# Patient Record
Sex: FEMALE | Race: WHITE | Employment: OTHER | URBAN - METROPOLITAN AREA
[De-identification: names, ages, dates, MRNs, and addresses within clinical notes are randomized per-mention and may not be internally consistent; named-entity substitution may affect disease eponyms.]

---

## 2017-08-31 ENCOUNTER — HOSPITAL ENCOUNTER (EMERGENCY)
Facility: HOSPITAL | Age: 62
Discharge: HOME | End: 2017-08-31
Attending: EMERGENCY MEDICINE
Payer: MEDICAID

## 2017-08-31 ENCOUNTER — APPOINTMENT (OUTPATIENT)
Dept: CT IMAGING | Facility: HOSPITAL | Age: 62
End: 2017-08-31
Attending: EMERGENCY MEDICINE
Payer: MEDICAID

## 2017-08-31 VITALS
OXYGEN SATURATION: 95 % | HEIGHT: 64 IN | RESPIRATION RATE: 18 BRPM | BODY MASS INDEX: 17.07 KG/M2 | TEMPERATURE: 98 F | HEART RATE: 79 BPM | WEIGHT: 100 LBS | DIASTOLIC BLOOD PRESSURE: 97 MMHG | SYSTOLIC BLOOD PRESSURE: 159 MMHG

## 2017-08-31 DIAGNOSIS — R63.4 WEIGHT LOSS, UNINTENTIONAL: Primary | ICD-10-CM

## 2017-08-31 DIAGNOSIS — R10.9 ABDOMINAL PAIN OF UNKNOWN ETIOLOGY: ICD-10-CM

## 2017-08-31 LAB
ALBUMIN SERPL-MCNC: 3.5 G/DL (ref 3.5–4.8)
ALP LIVER SERPL-CCNC: 70 U/L (ref 50–130)
ALT SERPL-CCNC: 24 U/L (ref 14–54)
AST SERPL-CCNC: 13 U/L (ref 15–41)
BASOPHILS # BLD AUTO: 0.07 X10(3) UL (ref 0–0.1)
BASOPHILS NFR BLD AUTO: 0.5 %
BILIRUB SERPL-MCNC: 0.4 MG/DL (ref 0.1–2)
BILIRUB UR QL STRIP.AUTO: NEGATIVE
BUN BLD-MCNC: 15 MG/DL (ref 8–20)
CALCIUM BLD-MCNC: 9.2 MG/DL (ref 8.3–10.3)
CHLORIDE: 101 MMOL/L (ref 101–111)
CLARITY UR REFRACT.AUTO: CLEAR
CO2: 23 MMOL/L (ref 22–32)
CREAT BLD-MCNC: 0.68 MG/DL (ref 0.55–1.02)
EOSINOPHIL # BLD AUTO: 0.45 X10(3) UL (ref 0–0.3)
EOSINOPHIL NFR BLD AUTO: 3.1 %
ERYTHROCYTE [DISTWIDTH] IN BLOOD BY AUTOMATED COUNT: 15.8 % (ref 11.5–16)
GLUCOSE BLD-MCNC: 88 MG/DL (ref 70–99)
GLUCOSE UR STRIP.AUTO-MCNC: NEGATIVE MG/DL
HCT VFR BLD AUTO: 40 % (ref 34–50)
HGB BLD-MCNC: 13.6 G/DL (ref 12–16)
IMMATURE GRANULOCYTE COUNT: 0.07 X10(3) UL (ref 0–1)
IMMATURE GRANULOCYTE RATIO %: 0.5 %
KETONES UR STRIP.AUTO-MCNC: NEGATIVE MG/DL
LEUKOCYTE ESTERASE UR QL STRIP.AUTO: NEGATIVE
LIPASE: 348 U/L (ref 73–393)
LYMPHOCYTES # BLD AUTO: 3.65 X10(3) UL (ref 0.9–4)
LYMPHOCYTES NFR BLD AUTO: 25.3 %
M PROTEIN MFR SERPL ELPH: 6.6 G/DL (ref 6.1–8.3)
MCH RBC QN AUTO: 31.1 PG (ref 27–33.2)
MCHC RBC AUTO-ENTMCNC: 34 G/DL (ref 31–37)
MCV RBC AUTO: 91.5 FL (ref 81–100)
MONOCYTES # BLD AUTO: 1.09 X10(3) UL (ref 0.1–0.6)
MONOCYTES NFR BLD AUTO: 7.5 %
NEUTROPHIL ABS PRELIM: 9.11 X10 (3) UL (ref 1.3–6.7)
NEUTROPHILS # BLD AUTO: 9.11 X10(3) UL (ref 1.3–6.7)
NEUTROPHILS NFR BLD AUTO: 63.1 %
NITRITE UR QL STRIP.AUTO: NEGATIVE
PH UR STRIP.AUTO: 6 [PH] (ref 4.5–8)
PLATELET # BLD AUTO: 327 10(3)UL (ref 150–450)
POTASSIUM SERPL-SCNC: 3.9 MMOL/L (ref 3.6–5.1)
PROT UR STRIP.AUTO-MCNC: NEGATIVE MG/DL
RBC # BLD AUTO: 4.37 X10(6)UL (ref 3.8–5.1)
RBC UR QL AUTO: NEGATIVE
RED CELL DISTRIBUTION WIDTH-SD: 53 FL (ref 35.1–46.3)
SODIUM SERPL-SCNC: 132 MMOL/L (ref 136–144)
SP GR UR STRIP.AUTO: 1.01 (ref 1–1.03)
UROBILINOGEN UR STRIP.AUTO-MCNC: <2 MG/DL
WBC # BLD AUTO: 14.4 X10(3) UL (ref 4–13)

## 2017-08-31 PROCEDURE — 99285 EMERGENCY DEPT VISIT HI MDM: CPT

## 2017-08-31 PROCEDURE — S0028 INJECTION, FAMOTIDINE, 20 MG: HCPCS | Performed by: EMERGENCY MEDICINE

## 2017-08-31 PROCEDURE — 81003 URINALYSIS AUTO W/O SCOPE: CPT | Performed by: EMERGENCY MEDICINE

## 2017-08-31 PROCEDURE — 80053 COMPREHEN METABOLIC PANEL: CPT | Performed by: EMERGENCY MEDICINE

## 2017-08-31 PROCEDURE — 96361 HYDRATE IV INFUSION ADD-ON: CPT

## 2017-08-31 PROCEDURE — 96374 THER/PROPH/DIAG INJ IV PUSH: CPT

## 2017-08-31 PROCEDURE — 85025 COMPLETE CBC W/AUTO DIFF WBC: CPT | Performed by: EMERGENCY MEDICINE

## 2017-08-31 PROCEDURE — 83690 ASSAY OF LIPASE: CPT | Performed by: EMERGENCY MEDICINE

## 2017-08-31 PROCEDURE — 74177 CT ABD & PELVIS W/CONTRAST: CPT | Performed by: EMERGENCY MEDICINE

## 2017-08-31 PROCEDURE — 99284 EMERGENCY DEPT VISIT MOD MDM: CPT

## 2017-08-31 RX ORDER — TRAMADOL HYDROCHLORIDE 50 MG/1
50 TABLET ORAL EVERY 8 HOURS PRN
COMMUNITY
End: 2020-08-04

## 2017-08-31 RX ORDER — FAMOTIDINE 10 MG/ML
20 INJECTION, SOLUTION INTRAVENOUS ONCE
Status: COMPLETED | OUTPATIENT
Start: 2017-08-31 | End: 2017-08-31

## 2017-08-31 RX ORDER — MAGNESIUM HYDROXIDE/ALUMINUM HYDROXICE/SIMETHICONE 120; 1200; 1200 MG/30ML; MG/30ML; MG/30ML
30 SUSPENSION ORAL ONCE
Status: COMPLETED | OUTPATIENT
Start: 2017-08-31 | End: 2017-08-31

## 2017-08-31 RX ORDER — ENALAPRIL MALEATE 5 MG/1
20 TABLET ORAL 2 TIMES DAILY
Status: ON HOLD | COMMUNITY
End: 2020-07-29

## 2017-08-31 RX ORDER — OMEPRAZOLE 40 MG/1
40 CAPSULE, DELAYED RELEASE ORAL DAILY
Qty: 30 CAPSULE | Refills: 0 | Status: SHIPPED | OUTPATIENT
Start: 2017-08-31 | End: 2017-09-30

## 2017-09-01 NOTE — ED PROVIDER NOTES
Patient Seen in: [facility] Emergency Department    History   Patient presents with:  Abdomen/Flank Pain (GI/)  Weight Loss Gain (metabolic)    Stated Complaint: weight loss & abd pain x 2 months    HPI    Patient comes in complaining of epigastri air)    Current:/97   Pulse 79   Temp 98 °F (36.7 °C) (Temporal)   Resp 18   Ht 162.6 cm (5' 4\")   Wt 45.4 kg   SpO2 95%   Breastfeeding?  No   BMI 17.16 kg/m²         Physical Exam    General:  Anxious, loose skin  Head and neck: Normocephalic, atra LAVENDER   RAINBOW DRAW LIGHT GREEN   UA HOLD     CT abdomen pelvis no acute process  ============================================================  ED Course  ------------------------------------------------------------  MDM     Patient CT and lab workup a

## 2017-09-01 NOTE — CM/SW NOTE
Spoke with pt at length for follow up care, reviewed discharge instructions. Advised pt to continue care with her PCP and keep close loop with her insurance coverage.

## 2017-10-20 ENCOUNTER — HOSPITAL (OUTPATIENT)
Dept: OTHER | Age: 62
End: 2017-10-20
Attending: INTERNAL MEDICINE

## 2017-10-20 ENCOUNTER — CHARTING TRANS (OUTPATIENT)
Dept: OTHER | Age: 62
End: 2017-10-20

## 2019-01-06 ENCOUNTER — HOSPITAL (OUTPATIENT)
Dept: OTHER | Age: 64
End: 2019-01-06
Attending: FAMILY MEDICINE

## 2020-07-27 ENCOUNTER — APPOINTMENT (OUTPATIENT)
Dept: CT IMAGING | Facility: HOSPITAL | Age: 65
DRG: 897 | End: 2020-07-27
Attending: EMERGENCY MEDICINE
Payer: MEDICARE

## 2020-07-27 ENCOUNTER — HOSPITAL ENCOUNTER (OUTPATIENT)
Age: 65
Discharge: ANOTHER HOSPITAL | End: 2020-07-27
Attending: EMERGENCY MEDICINE
Payer: MEDICARE

## 2020-07-27 ENCOUNTER — APPOINTMENT (OUTPATIENT)
Dept: GENERAL RADIOLOGY | Facility: HOSPITAL | Age: 65
DRG: 897 | End: 2020-07-27
Attending: EMERGENCY MEDICINE
Payer: MEDICARE

## 2020-07-27 ENCOUNTER — HOSPITAL ENCOUNTER (INPATIENT)
Facility: HOSPITAL | Age: 65
LOS: 4 days | Discharge: HOME | DRG: 897 | End: 2020-07-31
Attending: EMERGENCY MEDICINE | Admitting: INTERNAL MEDICINE
Payer: MEDICARE

## 2020-07-27 VITALS
HEART RATE: 72 BPM | RESPIRATION RATE: 18 BRPM | BODY MASS INDEX: 20.91 KG/M2 | WEIGHT: 118 LBS | HEIGHT: 63 IN | SYSTOLIC BLOOD PRESSURE: 172 MMHG | OXYGEN SATURATION: 96 % | DIASTOLIC BLOOD PRESSURE: 78 MMHG

## 2020-07-27 DIAGNOSIS — R20.0 LEFT ARM NUMBNESS: ICD-10-CM

## 2020-07-27 DIAGNOSIS — R42 DIZZINESS: Primary | ICD-10-CM

## 2020-07-27 DIAGNOSIS — H81.90 VESTIBULAR DISORDER: ICD-10-CM

## 2020-07-27 DIAGNOSIS — I21.4 NSTEMI (NON-ST ELEVATED MYOCARDIAL INFARCTION) (HCC): Primary | ICD-10-CM

## 2020-07-27 PROBLEM — E87.6 HYPOKALEMIA: Status: ACTIVE | Noted: 2020-07-27

## 2020-07-27 PROBLEM — R73.9 HYPERGLYCEMIA: Status: ACTIVE | Noted: 2020-07-27

## 2020-07-27 PROBLEM — E87.1 HYPONATREMIA: Status: ACTIVE | Noted: 2020-07-27

## 2020-07-27 PROBLEM — D72.829 LEUKOCYTOSIS: Status: ACTIVE | Noted: 2020-07-27

## 2020-07-27 LAB
ALBUMIN SERPL-MCNC: 4 G/DL (ref 3.4–5)
ALBUMIN/GLOB SERPL: 1.1 {RATIO} (ref 1–2)
ALP LIVER SERPL-CCNC: 101 U/L (ref 55–142)
ALT SERPL-CCNC: 30 U/L (ref 13–56)
ANION GAP SERPL CALC-SCNC: 7 MMOL/L (ref 0–18)
APTT PPP: 32.3 SECONDS (ref 25.4–36.1)
AST SERPL-CCNC: 19 U/L (ref 15–37)
BASOPHILS # BLD AUTO: 0.05 X10(3) UL (ref 0–0.2)
BASOPHILS NFR BLD AUTO: 0.3 %
BILIRUB SERPL-MCNC: 0.5 MG/DL (ref 0.1–2)
BUN BLD-MCNC: 10 MG/DL (ref 7–18)
BUN/CREAT SERPL: 20 (ref 10–20)
CALCIUM BLD-MCNC: 9.3 MG/DL (ref 8.5–10.1)
CHLORIDE SERPL-SCNC: 97 MMOL/L (ref 98–112)
CO2 SERPL-SCNC: 24 MMOL/L (ref 21–32)
CREAT BLD-MCNC: 0.5 MG/DL (ref 0.55–1.02)
DEPRECATED RDW RBC AUTO: 50.3 FL (ref 35.1–46.3)
EOSINOPHIL # BLD AUTO: 0.08 X10(3) UL (ref 0–0.7)
EOSINOPHIL NFR BLD AUTO: 0.5 %
ERYTHROCYTE [DISTWIDTH] IN BLOOD BY AUTOMATED COUNT: 16.5 % (ref 11–15)
GLOBULIN PLAS-MCNC: 3.6 G/DL (ref 2.8–4.4)
GLUCOSE BLD-MCNC: 110 MG/DL (ref 70–99)
HAV IGM SER QL: 2 MG/DL (ref 1.6–2.6)
HCT VFR BLD AUTO: 41.4 % (ref 35–48)
HGB BLD-MCNC: 14.2 G/DL (ref 12–16)
IMM GRANULOCYTES # BLD AUTO: 0.06 X10(3) UL (ref 0–1)
IMM GRANULOCYTES NFR BLD: 0.4 %
LIPASE SERPL-CCNC: 138 U/L (ref 73–393)
LYMPHOCYTES # BLD AUTO: 1.96 X10(3) UL (ref 1–4)
LYMPHOCYTES NFR BLD AUTO: 12.2 %
M PROTEIN MFR SERPL ELPH: 7.6 G/DL (ref 6.4–8.2)
MCH RBC QN AUTO: 28.9 PG (ref 26–34)
MCHC RBC AUTO-ENTMCNC: 34.3 G/DL (ref 31–37)
MCV RBC AUTO: 84.1 FL (ref 80–100)
MONOCYTES # BLD AUTO: 1.06 X10(3) UL (ref 0.1–1)
MONOCYTES NFR BLD AUTO: 6.6 %
NEUTROPHILS # BLD AUTO: 12.87 X10 (3) UL (ref 1.5–7.7)
NEUTROPHILS # BLD AUTO: 12.87 X10(3) UL (ref 1.5–7.7)
NEUTROPHILS NFR BLD AUTO: 80 %
OSMOLALITY SERPL CALC.SUM OF ELEC: 266 MOSM/KG (ref 275–295)
PLATELET # BLD AUTO: 347 10(3)UL (ref 150–450)
POTASSIUM SERPL-SCNC: 3.1 MMOL/L (ref 3.5–5.1)
RBC # BLD AUTO: 4.92 X10(6)UL (ref 3.8–5.3)
SARS-COV-2 RNA RESP QL NAA+PROBE: NOT DETECTED
SODIUM SERPL-SCNC: 128 MMOL/L (ref 136–145)
TROPONIN I SERPL-MCNC: 0.26 NG/ML (ref ?–0.04)
WBC # BLD AUTO: 16.1 X10(3) UL (ref 4–11)

## 2020-07-27 PROCEDURE — 99214 OFFICE O/P EST MOD 30 MIN: CPT

## 2020-07-27 PROCEDURE — 70450 CT HEAD/BRAIN W/O DYE: CPT | Performed by: EMERGENCY MEDICINE

## 2020-07-27 PROCEDURE — 71045 X-RAY EXAM CHEST 1 VIEW: CPT | Performed by: EMERGENCY MEDICINE

## 2020-07-27 PROCEDURE — 93005 ELECTROCARDIOGRAM TRACING: CPT

## 2020-07-27 PROCEDURE — 93010 ELECTROCARDIOGRAM REPORT: CPT

## 2020-07-27 RX ORDER — POTASSIUM CHLORIDE 20 MEQ/1
40 TABLET, EXTENDED RELEASE ORAL EVERY 4 HOURS
Status: COMPLETED | OUTPATIENT
Start: 2020-07-27 | End: 2020-07-28

## 2020-07-27 RX ORDER — HEPARIN SODIUM 5000 [USP'U]/ML
60 INJECTION INTRAVENOUS; SUBCUTANEOUS ONCE
Status: COMPLETED | OUTPATIENT
Start: 2020-07-27 | End: 2020-07-27

## 2020-07-27 RX ORDER — POTASSIUM CHLORIDE 20 MEQ/1
40 TABLET, EXTENDED RELEASE ORAL ONCE
Status: COMPLETED | OUTPATIENT
Start: 2020-07-27 | End: 2020-07-27

## 2020-07-27 RX ORDER — ASPIRIN 81 MG/1
324 TABLET, CHEWABLE ORAL ONCE
Status: COMPLETED | OUTPATIENT
Start: 2020-07-27 | End: 2020-07-27

## 2020-07-27 RX ORDER — POTASSIUM CHLORIDE 20 MEQ/1
40 TABLET, EXTENDED RELEASE ORAL ONCE
Status: DISCONTINUED | OUTPATIENT
Start: 2020-07-27 | End: 2020-07-27

## 2020-07-27 RX ORDER — SODIUM PHOSPHATE, DIBASIC AND SODIUM PHOSPHATE, MONOBASIC 7; 19 G/133ML; G/133ML
1 ENEMA RECTAL ONCE AS NEEDED
Status: DISCONTINUED | OUTPATIENT
Start: 2020-07-27 | End: 2020-07-31

## 2020-07-27 RX ORDER — METOCLOPRAMIDE HYDROCHLORIDE 5 MG/ML
10 INJECTION INTRAMUSCULAR; INTRAVENOUS EVERY 8 HOURS PRN
Status: DISCONTINUED | OUTPATIENT
Start: 2020-07-27 | End: 2020-07-31

## 2020-07-27 RX ORDER — HEPARIN SODIUM AND DEXTROSE 10000; 5 [USP'U]/100ML; G/100ML
12 INJECTION INTRAVENOUS ONCE
Status: COMPLETED | OUTPATIENT
Start: 2020-07-27 | End: 2020-07-28

## 2020-07-27 RX ORDER — DOCUSATE SODIUM 100 MG/1
100 CAPSULE, LIQUID FILLED ORAL 2 TIMES DAILY
Status: DISCONTINUED | OUTPATIENT
Start: 2020-07-27 | End: 2020-07-31

## 2020-07-27 RX ORDER — ONDANSETRON 2 MG/ML
4 INJECTION INTRAMUSCULAR; INTRAVENOUS EVERY 6 HOURS PRN
Status: DISCONTINUED | OUTPATIENT
Start: 2020-07-27 | End: 2020-07-31

## 2020-07-27 RX ORDER — ONDANSETRON 4 MG/1
4 TABLET, ORALLY DISINTEGRATING ORAL EVERY 8 HOURS PRN
Status: ON HOLD | COMMUNITY
End: 2020-07-27

## 2020-07-27 RX ORDER — ALPRAZOLAM 0.5 MG/1
0.5 TABLET ORAL ONCE
Status: COMPLETED | OUTPATIENT
Start: 2020-07-27 | End: 2020-07-27

## 2020-07-27 RX ORDER — BISACODYL 10 MG
10 SUPPOSITORY, RECTAL RECTAL
Status: DISCONTINUED | OUTPATIENT
Start: 2020-07-27 | End: 2020-07-31

## 2020-07-27 RX ORDER — ACETAMINOPHEN 325 MG/1
650 TABLET ORAL EVERY 6 HOURS PRN
Status: DISCONTINUED | OUTPATIENT
Start: 2020-07-27 | End: 2020-07-31

## 2020-07-27 RX ORDER — HEPARIN SODIUM AND DEXTROSE 10000; 5 [USP'U]/100ML; G/100ML
INJECTION INTRAVENOUS CONTINUOUS
Status: DISCONTINUED | OUTPATIENT
Start: 2020-07-28 | End: 2020-07-28

## 2020-07-27 RX ORDER — ALPRAZOLAM 0.25 MG/1
0.25 TABLET ORAL DAILY PRN
Status: DISCONTINUED | OUTPATIENT
Start: 2020-07-27 | End: 2020-07-28

## 2020-07-27 RX ORDER — DILTIAZEM HYDROCHLORIDE 60 MG/1
120 TABLET, FILM COATED ORAL 2 TIMES DAILY
Status: DISCONTINUED | OUTPATIENT
Start: 2020-07-27 | End: 2020-07-31

## 2020-07-27 RX ORDER — ENALAPRIL MALEATE 10 MG/1
20 TABLET ORAL DAILY
Status: DISCONTINUED | OUTPATIENT
Start: 2020-07-27 | End: 2020-07-31

## 2020-07-27 RX ORDER — POLYETHYLENE GLYCOL 3350 17 G/17G
17 POWDER, FOR SOLUTION ORAL DAILY PRN
Status: DISCONTINUED | OUTPATIENT
Start: 2020-07-27 | End: 2020-07-31

## 2020-07-27 RX ORDER — MECLIZINE HYDROCHLORIDE 25 MG/1
25 TABLET ORAL 3 TIMES DAILY PRN
Status: ON HOLD | COMMUNITY
End: 2020-07-27

## 2020-07-27 RX ORDER — TRAMADOL HYDROCHLORIDE 50 MG/1
50 TABLET ORAL EVERY 6 HOURS PRN
Status: DISCONTINUED | OUTPATIENT
Start: 2020-07-27 | End: 2020-07-28 | Stop reason: SDUPTHER

## 2020-07-27 RX ORDER — DILTIAZEM HYDROCHLORIDE 120 MG/1
120 TABLET, FILM COATED ORAL 2 TIMES DAILY
Status: ON HOLD | COMMUNITY
End: 2020-07-30

## 2020-07-27 NOTE — ED PROVIDER NOTES
Patient Seen in: [facility]      History   Patient presents with:  Dizziness  Nausea/Vomiting/Diarrhea  Chest Pain Angina    Stated Complaint: L ARM NUMBNESS/DIZZY X 2 DAYS    HPI    Patient complains of dizziness.   Patient has [07/27/20 1740]   BP (!) 172/78   Pulse 94   Resp 18   Temp    Temp src Oral   SpO2 97 %   O2 Device None (Room air)       Current:BP (!) 172/78   Pulse 94   Resp 18   Ht 160 cm (5' 3\")   Wt 53.5 kg   SpO2 97%   BMI 20.90 kg/m²         Physical Exam  Gene the left arm is concerning. I recommended full evaluation in the hospital emergency department.   Patient's choice was [facility].  Patient is significantly symptomatic and I do not recommend transfer by private auto  Physician on duty was notified to

## 2020-07-27 NOTE — ED INITIAL ASSESSMENT (HPI)
Patient presents for evaluation of left arm numbness and dizziness for two days. She reports nausea as well and feeling like her heart is racing. She states she is out of her prescription for xanax.

## 2020-07-27 NOTE — ED INITIAL ASSESSMENT (HPI)
Dizziness and nausea x 2 days, L arm numbness hand and arm x 2 hours    Pt states she is out of her alprazolam and they won't refill until august    Denies chest pain/cough/syncope/sob/diarrhea/fever

## 2020-07-28 ENCOUNTER — APPOINTMENT (OUTPATIENT)
Dept: CV DIAGNOSTICS | Facility: HOSPITAL | Age: 65
DRG: 897 | End: 2020-07-28
Attending: INTERNAL MEDICINE
Payer: MEDICARE

## 2020-07-28 ENCOUNTER — APPOINTMENT (OUTPATIENT)
Dept: MRI IMAGING | Facility: HOSPITAL | Age: 65
DRG: 897 | End: 2020-07-28
Attending: Other
Payer: MEDICARE

## 2020-07-28 LAB
ANION GAP SERPL CALC-SCNC: 3 MMOL/L (ref 0–18)
APTT PPP: 44.5 SECONDS (ref 25.4–36.1)
APTT PPP: 51.9 SECONDS (ref 25.4–36.1)
ATRIAL RATE: 65 BPM
ATRIAL RATE: 71 BPM
BASOPHILS # BLD AUTO: 0.07 X10(3) UL (ref 0–0.2)
BASOPHILS NFR BLD AUTO: 0.8 %
BUN BLD-MCNC: 12 MG/DL (ref 7–18)
BUN/CREAT SERPL: 17.4 (ref 10–20)
CALCIUM BLD-MCNC: 9.4 MG/DL (ref 8.5–10.1)
CHLORIDE SERPL-SCNC: 105 MMOL/L (ref 98–112)
CO2 SERPL-SCNC: 27 MMOL/L (ref 21–32)
CREAT BLD-MCNC: 0.69 MG/DL (ref 0.55–1.02)
DEPRECATED RDW RBC AUTO: 53.2 FL (ref 35.1–46.3)
EOSINOPHIL # BLD AUTO: 0.1 X10(3) UL (ref 0–0.7)
EOSINOPHIL NFR BLD AUTO: 1.1 %
ERYTHROCYTE [DISTWIDTH] IN BLOOD BY AUTOMATED COUNT: 16.9 % (ref 11–15)
GLUCOSE BLD-MCNC: 97 MG/DL (ref 70–99)
HCT VFR BLD AUTO: 42.2 % (ref 35–48)
HGB BLD-MCNC: 14.2 G/DL (ref 12–16)
IMM GRANULOCYTES # BLD AUTO: 0.03 X10(3) UL (ref 0–1)
IMM GRANULOCYTES NFR BLD: 0.3 %
LYMPHOCYTES # BLD AUTO: 1.8 X10(3) UL (ref 1–4)
LYMPHOCYTES NFR BLD AUTO: 19.7 %
MCH RBC QN AUTO: 28.9 PG (ref 26–34)
MCHC RBC AUTO-ENTMCNC: 33.6 G/DL (ref 31–37)
MCV RBC AUTO: 85.9 FL (ref 80–100)
MONOCYTES # BLD AUTO: 0.76 X10(3) UL (ref 0.1–1)
MONOCYTES NFR BLD AUTO: 8.3 %
NEUTROPHILS # BLD AUTO: 6.37 X10 (3) UL (ref 1.5–7.7)
NEUTROPHILS # BLD AUTO: 6.37 X10(3) UL (ref 1.5–7.7)
NEUTROPHILS NFR BLD AUTO: 69.8 %
OSMOLALITY SERPL CALC.SUM OF ELEC: 280 MOSM/KG (ref 275–295)
P AXIS: 63 DEGREES
P AXIS: 72 DEGREES
P-R INTERVAL: 128 MS
P-R INTERVAL: 130 MS
PLATELET # BLD AUTO: 325 10(3)UL (ref 150–450)
POTASSIUM SERPL-SCNC: 4.6 MMOL/L (ref 3.5–5.1)
POTASSIUM SERPL-SCNC: 4.6 MMOL/L (ref 3.5–5.1)
Q-T INTERVAL: 400 MS
Q-T INTERVAL: 444 MS
QRS DURATION: 76 MS
QRS DURATION: 78 MS
QTC CALCULATION (BEZET): 416 MS
QTC CALCULATION (BEZET): 482 MS
R AXIS: 33 DEGREES
R AXIS: 72 DEGREES
RBC # BLD AUTO: 4.91 X10(6)UL (ref 3.8–5.3)
SODIUM SERPL-SCNC: 135 MMOL/L (ref 136–145)
T AXIS: -50 DEGREES
T AXIS: 67 DEGREES
TROPONIN I SERPL-MCNC: 0.22 NG/ML (ref ?–0.04)
TROPONIN I SERPL-MCNC: 0.24 NG/ML (ref ?–0.04)
VENTRICULAR RATE: 65 BPM
VENTRICULAR RATE: 71 BPM
WBC # BLD AUTO: 9.1 X10(3) UL (ref 4–11)

## 2020-07-28 PROCEDURE — 93306 TTE W/DOPPLER COMPLETE: CPT | Performed by: INTERNAL MEDICINE

## 2020-07-28 PROCEDURE — 70553 MRI BRAIN STEM W/O & W/DYE: CPT | Performed by: OTHER

## 2020-07-28 PROCEDURE — 90792 PSYCH DIAG EVAL W/MED SRVCS: CPT | Performed by: OTHER

## 2020-07-28 RX ORDER — SCOLOPAMINE TRANSDERMAL SYSTEM 1 MG/1
1 PATCH, EXTENDED RELEASE TRANSDERMAL
Status: DISCONTINUED | OUTPATIENT
Start: 2020-07-28 | End: 2020-07-31

## 2020-07-28 RX ORDER — TRAMADOL HYDROCHLORIDE 50 MG/1
100 TABLET ORAL EVERY 6 HOURS PRN
Status: DISCONTINUED | OUTPATIENT
Start: 2020-07-28 | End: 2020-07-31

## 2020-07-28 RX ORDER — CLONAZEPAM 0.5 MG/1
0.5 TABLET ORAL ONCE
Status: COMPLETED | OUTPATIENT
Start: 2020-07-28 | End: 2020-07-28

## 2020-07-28 RX ORDER — ACETAMINOPHEN, ASPIRIN AND CAFFEINE 250; 250; 65 MG/1; MG/1; MG/1
2 TABLET, FILM COATED ORAL
Status: ON HOLD | COMMUNITY
End: 2020-07-30

## 2020-07-28 RX ORDER — TRAMADOL HYDROCHLORIDE 50 MG/1
100 TABLET ORAL EVERY 6 HOURS PRN
Status: DISCONTINUED | OUTPATIENT
Start: 2020-07-28 | End: 2020-07-28 | Stop reason: SDUPTHER

## 2020-07-28 RX ORDER — TRAMADOL HYDROCHLORIDE 50 MG/1
50 TABLET ORAL EVERY 6 HOURS PRN
Status: DISCONTINUED | OUTPATIENT
Start: 2020-07-28 | End: 2020-07-31

## 2020-07-28 RX ORDER — ESCITALOPRAM OXALATE 10 MG/1
10 TABLET ORAL DAILY
Status: DISCONTINUED | OUTPATIENT
Start: 2020-07-29 | End: 2020-07-31

## 2020-07-28 RX ORDER — CLONAZEPAM 0.5 MG/1
1 TABLET ORAL ONCE
Status: DISCONTINUED | OUTPATIENT
Start: 2020-07-28 | End: 2020-07-28

## 2020-07-28 RX ORDER — LORAZEPAM 2 MG/ML
1 INJECTION INTRAMUSCULAR ONCE
Status: DISCONTINUED | OUTPATIENT
Start: 2020-07-28 | End: 2020-07-28

## 2020-07-28 RX ORDER — LABETALOL HYDROCHLORIDE 5 MG/ML
10 INJECTION, SOLUTION INTRAVENOUS EVERY 4 HOURS PRN
Status: DISCONTINUED | OUTPATIENT
Start: 2020-07-28 | End: 2020-07-31

## 2020-07-28 RX ORDER — NICOTINE 21 MG/24HR
1 PATCH, TRANSDERMAL 24 HOURS TRANSDERMAL DAILY
Status: DISCONTINUED | OUTPATIENT
Start: 2020-07-28 | End: 2020-07-31

## 2020-07-28 RX ORDER — CLONAZEPAM 0.5 MG/1
0.5 TABLET ORAL 2 TIMES DAILY
Status: DISCONTINUED | OUTPATIENT
Start: 2020-07-28 | End: 2020-07-30

## 2020-07-28 NOTE — PROGRESS NOTES
Received patient this am aaox4- anxious   SR on tele  No c/o pain  Intermittent c/o numbness to left side- inconsistent with answers  2d echo today  MRI this afternoon- patient very anxious to do this, but willing to try with IV lorazepam 30 min prior  Nikhil

## 2020-07-28 NOTE — PLAN OF CARE
Received patient, alert and oriented. Claimed left arm numbness is gone after given Xanax. Claimed she's starving because she has not eaten for 5 days, nausea is gone. On Heparin drip per ACS protocol. Denied chest pain, denied SOB. Discussed POC.  Due functionality and self care  Description  INTERVENTIONS  - Monitor swallowing and airway patency with patient fatigue and changes in neurological status  - Encourage and assist patient to increase activity and self care with guidance from PT/OT  - Encourag

## 2020-07-28 NOTE — H&P
[facility] Hospitalist H&P       CC: Patient presents with:  Dizziness       PCP: Ondina Jim DO    History of Present Illness:  Patient is a 77year old female with PMH sig for HTN, anxiety, fibromyalgia presented for evaluation of dizziness.  Started ~1wk ago of Systems  Comprehensive ROS reviewed and negative except for what's stated above.        OBJECTIVE:  BP (!) 180/90 (BP Location: Left arm)   Pulse 71   Temp 98.3 °F (36.8 °C) (Oral)   Resp 17   Ht 5' 3\" (1.6 m)   Wt 119 lb 0.8 oz (54 kg)   SpO2 98%   BMI territorial infarction. Stable asymmetric calcification of the left basal ganglia. Mild age-indeterminate microvascular ischemic changes in the cerebral white matter. There is no evident fracture.   The visualized paranasal sinuses and mastoid air cells specified by cardiology    # Tobacco use  - nicotine patch ordered    # anxiety d/o  - anxiety exacerbated in setting of dizziness, hospitalization  - concerns for x[place]ax [place]. [place] xanax 0.5mg BID PRN #180 tabs on 5/27.  Lindsay Dumas

## 2020-07-28 NOTE — CONSULTS
[facility]  Report of Psychiatric Consultation    Анна Robinsbarney Patient Status:  Inpatient    1955 MRN [id number]   [facility] 8NE-A Attending Kishan Mcclellan MD   [facility] Day # 1 PCP Jazz Fernandez May, DO     Date of Admission: 20  Date up to 30 mins and came out of the blue. There were times when she would have daily episodes. In her 27's, she had an episode and thought she was having a heart attack. She was brought to the ED and dx with panic disorder.  She was prescribed Xanax and has b uses smokeless tobacco. She reports that she does not drink alcohol or use drugs.     Allergies:    Sulfa Antibiotics       UNKNOWN    Medications:    Current Facility-Administered Medications:   •  Labetalol HCl (TRANDATE) injection 10 mg, 10 mg, Intraveno and Concentration:   fair  Memory:  intact immediate, recent, remote  Language: Intact naming and repetition  Fund of Knowledge: Able to recite name of [place] president    Insight: limited  Judgment: limited    Laboratory Data:  Lab Results   Component Value D

## 2020-07-28 NOTE — CONSULTS
Consulting Physician: Dr. Fabiano Robertson    CC:  Dizziness    HPI:  This is a 77year old female presenting for evaluation of dizziness. She states that about one week ago, she developed dizziness. She describes a room spinning sensation with accompanied nausea. member of club or organization: Not on file        Attends meetings of clubs or organizations: Not on file        Relationship status: Not on file      Intimate partner violence:        Fear of current or ex partner: Not on file        Emotionally abused:

## 2020-07-28 NOTE — BH PROGRESS NOTE
Referrals was placed in the d/c section of this record for the pt to follow up with a psychiatrist and psychotherapist per Dr. Layo Gil.

## 2020-07-28 NOTE — CONSULTS
[facility] Cardiology Consultation    Jose Selby Patient Status:  Inpatient    1955 MRN [id number]   [facility] 8NE-A Attending Kae Sweeney MD   [facility] Day # 1 PCP Indigo Blue May, DO     Reason for Consultation:  Elevated troponin      History Warm and dry. Telemetry: sinus    Laboratories and Data:  Diagnostics:    EKG, 7/28/2020:  NSSTTW changes    CXR, 7/28/2020:  normal    Labs:   HEM:  Recent Labs   Lab 07/27/20  1830 07/28/20  0736   WBC 16.1* 9.1   HGB 14.2 14.2   .0 325. 0

## 2020-07-28 NOTE — ED PROVIDER NOTES
Patient Seen in: [facility] 8ne-a      History   Patient presents with:  Dizziness    Stated Complaint: left arm numbness, dizziness     HPI    This is a 55-year-old woman history of hypertension, anxiety, transferred from immediate care for further Exam  Vitals signs and nursing note reviewed. General: Older woman sitting in the bed no acute distress  Head: Normocephalic and atraumatic. HEENT:  Mucous membranes are moist.   Cardiovascular:  Normal rate and regular rhythm.   No Edema  Pulmonary:  P 1.06 (*)     All other components within normal limits   CBC W/ DIFFERENTIAL - Abnormal; Notable for the following components:    RDW 16.9 (*)     RDW-SD 53.2 (*)     All other components within normal limits   LIPASE - Normal   MAGNESIUM - Normal   PTT, A the [facility] hospitalist             MDM     89year-old woman sent from immediate care for further evaluation of dizziness. Also reports associated numbness in the left arm.   She is ambulating with stable narrow-base gait, appears to be otherwise neurologica Reviewed: 6/26/2014          ICD-10-CM Noted POA    * (Principal) NSTEMI (non-ST elevated myocardial infarction) ([facility] [address] I21.4 7/27/2020 Unknown    Hyperglycemia R73.9 7/27/2020 Yes    Hypokalemia E87.6 7/27/2020 Yes    Hyponatremia E87.1 7/27/2020 Yes    Natanael Hall

## 2020-07-29 LAB — TROPONIN I SERPL-MCNC: 0.22 NG/ML (ref ?–0.04)

## 2020-07-29 PROCEDURE — 99232 SBSQ HOSP IP/OBS MODERATE 35: CPT | Performed by: OTHER

## 2020-07-29 RX ORDER — NICOTINE 21 MG/24HR
1 PATCH, TRANSDERMAL 24 HOURS TRANSDERMAL DAILY
Status: DISCONTINUED | OUTPATIENT
Start: 2020-07-29 | End: 2020-07-29

## 2020-07-29 RX ORDER — ESCITALOPRAM OXALATE 10 MG/1
10 TABLET ORAL DAILY
Qty: 30 TABLET | Refills: 2 | Status: SHIPPED | OUTPATIENT
Start: 2020-07-30 | End: 2020-08-04

## 2020-07-29 RX ORDER — NICOTINE 21 MG/24HR
1 PATCH, TRANSDERMAL 24 HOURS TRANSDERMAL DAILY
Qty: 7 PATCH | Refills: 1 | Status: SHIPPED | OUTPATIENT
Start: 2020-07-30 | End: 2020-08-04

## 2020-07-29 RX ORDER — MECLIZINE HCL 12.5 MG/1
25 TABLET ORAL 3 TIMES DAILY PRN
Status: DISCONTINUED | OUTPATIENT
Start: 2020-07-29 | End: 2020-07-30

## 2020-07-29 RX ORDER — CLONAZEPAM 0.5 MG/1
0.5 TABLET ORAL 2 TIMES DAILY
Qty: 60 TABLET | Refills: 2 | Status: SHIPPED | OUTPATIENT
Start: 2020-07-29 | End: 2020-07-31

## 2020-07-29 RX ORDER — ONDANSETRON 4 MG/1
4 TABLET, ORALLY DISINTEGRATING ORAL EVERY 8 HOURS PRN
COMMUNITY
End: 2020-08-04

## 2020-07-29 RX ORDER — ENALAPRIL MALEATE 20 MG/1
20 TABLET ORAL DAILY
Refills: 0 | Status: SHIPPED | COMMUNITY
Start: 2020-07-29 | End: 2020-09-01

## 2020-07-29 RX ORDER — MECLIZINE HYDROCHLORIDE 25 MG/1
25 TABLET ORAL EVERY 8 HOURS PRN
COMMUNITY
End: 2020-08-04

## 2020-07-29 RX ORDER — ONDANSETRON 4 MG/1
4 TABLET, ORALLY DISINTEGRATING ORAL EVERY 8 HOURS PRN
Status: DISCONTINUED | OUTPATIENT
Start: 2020-07-29 | End: 2020-07-31

## 2020-07-29 NOTE — PROGRESS NOTES
[facility]  Cardiology Progress Note    Nathaly Jones Patient Status:  Inpatient    1955 MRN [id number]   [facility] 8NE-A Attending Casey Lee,*   Hosp Day # 2 PCP Diamante Velasquez May,      Assessment:  +troponin-no EKG prince 07/27/20 1830 07/28/20  0736   BUN 10 12   CREATSERUM 0.50* 0.69   MG 2.0  --    * 135*   K 3.1* 4.6  4.6   CL 97* 105   CO2 24.0 27.0   CA 9.3 9.4     Recent Labs     07/27/20 1830   ALT 30   AST 19   ALB 4.0     No results for input(s): BNP in th

## 2020-07-29 NOTE — PROGRESS NOTES
[facility] Hospitalist Progress Note                                                                   [address]  5/21/1955    CC: FU dizziness    Interval History:  - Feels better today - dizzin Neurology workup has been unrevealing including MRI of the brain  - Suspect related to benzo withdrawal  - Eating better today, lytes improved  - No focal deficits noted on exam     # Troponin elevation, h/o HTN  - appec cardiology recs  - echo reviewed  -

## 2020-07-29 NOTE — PLAN OF CARE
Patient alert, and anxious. On room air, NSR, maintaining normal sats. Up with standby assist. Patient c/o left arm numbness and occasional dizziness  that \"hasn't gone away. \" Patient updated with plan of care. Fall precautions in place.  Will continue to neurological status  - Encourage and assist patient to increase activity and self care with guidance from PT/OT  - Encourage visually impaired, hearing impaired and aphasic patients to use assistive/communication devices  Outcome: Progressing

## 2020-07-29 NOTE — PROGRESS NOTES
Subjective     No overnight events    Dizziness and left arm numbness have resolved this morning    • nicotine  1 patch Transdermal Daily   • scopolamine  1 patch Transdermal Q72H   • escitalopram  10 mg Oral Daily   • ClonazePAM  0.5 mg Oral BID   • timmy

## 2020-07-29 NOTE — PHYSICAL THERAPY NOTE
PHYSICAL THERAPY QUICK EVALUATION - INPATIENT    Room Number: 1456/7079-S  Evaluation Date: 7/29/2020  Presenting Problem: dizziness  Physician Order: PT Eval and Treat    Problem List  Principal Problem:    NSTEMI (non-ST elevated myocardial infarction) arms (e.g., wheelchair, bedside commode, etc.): None   -   Moving from lying on back to sitting on the side of the bed?: None   How much help from another person does the patient currently need. ..   -   Moving to and from a bed to a chair (including a whee bed. RN aware of session. Patient End of Session: In bed;Needs met;Call light within reach;RN aware of session/findings; All patient questions and concerns addressed    ASSESSMENT   Patient is a 77year old female admitted on 7/27/2020 for dizziness.  Pe

## 2020-07-29 NOTE — PROGRESS NOTES
[facility]  Report of Psychiatric Progress Note    Faviola Romero Patient Status:  Inpatient    1955 MRN [id number]   [facility] 8NE-A Attending Manny Monzon MD   [address] Day # 2 PCP Candi Ruano May, DO     Date of Admission: 20  Dylan and came out of the blue. There were times when she would have daily episodes. In her 27's, she had an episode and thought she was having a heart attack. She was brought to the ED and dx with panic disorder.  She was prescribed Xanax and has been on it for She is a retired . Past Medical History:   Diagnosis Date   • Anxiety state, unspecified    • Fibromyalgia    • High blood pressure    • Unspecified essential hypertension      History reviewed. No pertinent surgical history.   Family Histor 139/73   Pulse: 68   Resp: 18   Temp: 98.3 °F (36.8 °C)     Appearance: fair grooming  Behavior: cooperative    Speech: normal rate, rhythm and volume    Mood: anxious  Affect: Congruent    Thought process: logical mostly, circumstantial about her need for

## 2020-07-29 NOTE — PLAN OF CARE
Received patient this am aaox4- emotions remain labile. Patient remains inconsistent with symptom reporting.  Early this am stated no neuro symptoms, later in am reports dizziness that \"never went away\" also states that when she has this before, she was stability  Description  INTERVENTIONS:  - Monitor vital signs, rhythm, and trends  - Monitor for bleeding, hypotension and signs of decreased cardiac output  - Evaluate effectiveness of vasoactive medications to optimize hemodynamic stability  - Monitor ar

## 2020-07-30 ENCOUNTER — APPOINTMENT (OUTPATIENT)
Dept: CV DIAGNOSTICS | Facility: HOSPITAL | Age: 65
DRG: 897 | End: 2020-07-30
Attending: INTERNAL MEDICINE
Payer: MEDICARE

## 2020-07-30 PROCEDURE — 93350 STRESS TTE ONLY: CPT | Performed by: INTERNAL MEDICINE

## 2020-07-30 PROCEDURE — 93017 CV STRESS TEST TRACING ONLY: CPT | Performed by: INTERNAL MEDICINE

## 2020-07-30 PROCEDURE — 93018 CV STRESS TEST I&R ONLY: CPT | Performed by: INTERNAL MEDICINE

## 2020-07-30 PROCEDURE — 99232 SBSQ HOSP IP/OBS MODERATE 35: CPT | Performed by: OTHER

## 2020-07-30 RX ORDER — CLONAZEPAM 0.5 MG/1
0.25 TABLET ORAL NIGHTLY
Status: DISCONTINUED | OUTPATIENT
Start: 2020-07-30 | End: 2020-07-31

## 2020-07-30 RX ORDER — DILTIAZEM HYDROCHLORIDE 240 MG/1
240 CAPSULE, COATED, EXTENDED RELEASE ORAL DAILY
Qty: 30 CAPSULE | Refills: 1 | Status: SHIPPED | OUTPATIENT
Start: 2020-07-30 | End: 2020-08-04

## 2020-07-30 NOTE — PROGRESS NOTES
[facility]  Report of Psychiatric Progress Note    Luh Cabrera Patient Status:  Inpatient    1955 MRN [id number]   [facility] 8NE-A Attending Julianna Chiang MD   [facility] Day # 3 PCP Janeen Ghosh May, DO     Date of Admission: 20  Dylan to 30 mins and came out of the blue. There were times when she would have daily episodes. In her 27's, she had an episode and thought she was having a heart attack. She was brought to the ED and dx with panic disorder.  She was prescribed Xanax and has been elevated mood, racing thoughts, decreased need for sleep, grandiose thoughts. Past Psychiatric History: Panic disorder without agoraphobia.     Substance Use History: Smokes cigarettes    Psych Family History: None that she is aware of    Social and Deve Metoclopramide HCl (REGLAN) injection 10 mg, 10 mg, Intravenous, Q8H PRN  •  dilTIAZem HCl (CARDIZEM) tab 120 mg, 120 mg, Oral, BID  •  Enalapril Maleate (VASOTEC) tab 20 mg, 20 mg, Oral, Daily    Review of Systems   Constitutional: Positive for malaise/fa

## 2020-07-30 NOTE — PROGRESS NOTES
Cardiac Diagnostics preliminary stress echo:  Patient was very anxious and dizzy throughout with 3 staff members helping her with treadmill with echo tech on standby for images.   Patient walked 6 minutes on Modified Roger protocol (per Dr. Heath Webb' instruct

## 2020-07-30 NOTE — PROGRESS NOTES
[facility] Hospitalist Progress Note                                                                   [address]  5/21/1955    CC: FU dizziness    Interval History:  - Reports ongoing dizziness a Neurology workup has been unrevealing including MRI of the brain  - Suspect related to benzo withdrawal +/- inner ear/vertigo - vestibular PT  - Med management for benzo withdrawal per psych- see below  - Eating better today, lytes improved  - No focal def

## 2020-07-30 NOTE — PROGRESS NOTES
[facility] Cardiology  Progress Note    [place] Virgilio Patient Status:  Inpatient    1955 MRN [id number]   [facility] 8NE-A Attending [facility] Day # 3 PCP Steve Mendez May, DO       Subjective:      Ms. Pillo Elizabeth co ventricle: The cavity size was normal. Wall thickness was normal. The estimated ejection fraction was 60-65%. Doppler parameters are consistent with abnormal left ventricular relaxation - grade 1 diastolic dysfunction.   2. Aortic valve: Trivial regurgitati

## 2020-07-30 NOTE — PLAN OF CARE
Patient axox4 on room air , Bp elevated ,schuelded Cardizem helped , plan of care updated , patient comfortable, will continue to monitor closely      Problem: Patient/Family Goals  Goal: Patient/Family Long Term Goal  Description  Patient's Long Term Goal status  - Initiate measures to prevent increased intracranial pressure  - Maintain blood pressure and fluid volume within ordered parameters to optimize cerebral perfusion and minimize risk of hemorrhage  - Monitor temperature, glucose, and sodium.  Initiat

## 2020-07-30 NOTE — PLAN OF CARE
Neuro: AOx4. Patient c/o dizziness. Patient is drowsy, forgetful, impulsive, poor safety awareness, short-term memory loss. Resp: Clear. Room air. Denies cough. Cardiac: SB/NSR. Pedal pulses palpable. No edema. GI: Patient reports last BM 7/29/2020.    functionality and self care  Description  INTERVENTIONS  - Monitor swallowing and airway patency with patient fatigue and changes in neurological status  - Encourage and assist patient to increase activity and self care with guidance from PT/OT  - Encourag

## 2020-07-30 NOTE — PROGRESS NOTES
Subjective     No overnight events    Patient continues to feel dizzy; it seems to get worse when she turns her head to the left.     She notes feeling very drowsy after taking Meclizine    • nicotine  1 patch Transdermal Daily   • scopolamine  1 patch Dannielle Akhtar

## 2020-07-31 VITALS
OXYGEN SATURATION: 97 % | SYSTOLIC BLOOD PRESSURE: 162 MMHG | RESPIRATION RATE: 18 BRPM | HEART RATE: 61 BPM | HEIGHT: 63 IN | TEMPERATURE: 99 F | BODY MASS INDEX: 21.09 KG/M2 | DIASTOLIC BLOOD PRESSURE: 77 MMHG | WEIGHT: 119.06 LBS

## 2020-07-31 PROCEDURE — 99232 SBSQ HOSP IP/OBS MODERATE 35: CPT | Performed by: OTHER

## 2020-07-31 RX ORDER — CLONAZEPAM 0.5 MG/1
0.25 TABLET ORAL NIGHTLY
Qty: 15 TABLET | Refills: 2 | Status: SHIPPED | OUTPATIENT
Start: 2020-07-31 | End: 2020-08-04

## 2020-07-31 NOTE — PROGRESS NOTES
[facility] Cardiology  Progress Note    Alireza Arreguin Patient Status:  Inpatient    1955 MRN [id number]   [facility] 8NE-A Attending Odalys Vanegas   [facility] Day # 4 PCP Pamela Major May, DO     Subjective:   No chest pain. distention. Cardiovascular:  Cardiovascular examination demonstrates a regular rate and rhythm. There is normal S1, S2. There is no S3 or S4. There are no murmurs, rubs, or gallops. No click is appreciated.   PMI is nondisplaced with a normal apical i normal.       Stress Echo:  No ECG or Echo evidence of ischemia at HR attained. Study limited by reaching only 69% MPHR for age on modified Roger protocol. Assessment:    1. HTN   -Lability reflects anxiety   -Controlled   2. Anxiety / depression  3.

## 2020-07-31 NOTE — PROGRESS NOTES
Pt discharged home. IV removed, tele dc'd and returned to monitor tech. F/U instructions provided and discussed. Pt and family verbalized understanding.  Medications with personal belongings, given by Meds to [facility] program. Discussed adverse reactions and si

## 2020-07-31 NOTE — DISCHARGE SUMMARY
General Medicine Discharge Summary     Patient ID:  Owen Olivares  77year old  5/21/1955    Admit date: 7/27/2020    Discharge date and time:  7/31/20-    Attending Physician: Emma Rausch denied. - suspect current symptoms on admit are related to benzo withdrawal  - appreciate psych input  - On Klonopin 0.25mg nightly- doing better today. Not drowsy. Also on lexapro. OK for DC today per acli with outpatient FU.     # Chronic pain: has been Pulse: 61   Resp: 18   Temp: 98.6 °F (37 °C)       General: no acute distress, alert and oriented x 3  Heart: RRR  Lungs: clear bilaterally, no active wheezing  Abdomen: nontender, nondistended, intact BS  Extremities: no pedal edema   Neuro: CN inact, n

## 2020-07-31 NOTE — PROGRESS NOTES
[facility]  Report of Psychiatric Progress Note    Dextermaria del carmen Finn Patient Status:  Inpatient    1955 MRN [id number]   [facility] 8NE-A Attending Debora Boone MD   [facility] Day # 4 PCP Jdoie Shetty May, DO     Date of Admission: 20  Dylan came out of the blue. There were times when she would have daily episodes. In her 27's, she had an episode and thought she was having a heart attack. She was brought to the ED and dx with panic disorder.  She was prescribed Xanax and has been on it for over not feel drowsy this AM. She c/o mild dizziness. No CP or SOB. No signs of ischemia on limited cardiac stress test. She is open to taking both the Lexapro daily and Klonipin nightly. Psychiatry Review Systems: No voices or visions.  No elevated mood, rac bisacodyl (DULCOLAX) rectal suppository 10 mg, 10 mg, Rectal, Daily PRN  •  Fleet Enema (FLEET) 7-19 GM/118ML enema 133 mL, 1 enema, Rectal, Once PRN  •  ondansetron HCl (ZOFRAN) injection 4 mg, 4 mg, Intravenous, Q6H PRN  •  Metoclopramide HCl (REGLAN) in

## 2020-07-31 NOTE — PLAN OF CARE
Patient axo x4 with period of forgetfulness ,on room air no distress noted, denies any pain and frequently made comfortable position and brp , ambulation with assist only ,unsteady gait especially  during sleep times , bp elevated , and scheduled diltiazem measures to prevent increased intracranial pressure  - Maintain blood pressure and fluid volume within ordered parameters to optimize cerebral perfusion and minimize risk of hemorrhage  - Monitor temperature, glucose, and sodium.  Initiate appropriate inter

## 2020-07-31 NOTE — PLAN OF CARE
Pt c/o generalized pain, tx with PRN Tramadol see eMAR. Pt c/o lightheadedness, BP stable at this time. MD aware of lightheadedness. A/ox4. Anxious. VSS. Lung sounds clear bilaterally, on room air. NSR on telemetry.  Pt c/o palpitations, tele and HR no emergency measures for life threatening arrhythmias  - Monitor electrolytes and administer replacement therapy as ordered  Outcome: Progressing     Problem: NEUROLOGICAL - ADULT  Goal: Achieves stable or improved neurological status  Description  INTERVENT

## 2020-08-04 PROBLEM — I10 ESSENTIAL HYPERTENSION: Status: ACTIVE | Noted: 2020-08-04

## 2020-08-10 NOTE — PROGRESS NOTES
Patient failed to reach target heart rate, but no ischemia seen on stress test. Please inform patient of results.

## 2020-09-01 PROBLEM — E87.1 HYPONATREMIA: Status: RESOLVED | Noted: 2020-07-27 | Resolved: 2020-09-01

## 2020-09-01 PROBLEM — R73.9 HYPERGLYCEMIA: Status: RESOLVED | Noted: 2020-07-27 | Resolved: 2020-09-01

## 2020-09-01 PROBLEM — D72.829 LEUKOCYTOSIS: Status: RESOLVED | Noted: 2020-07-27 | Resolved: 2020-09-01

## 2020-09-01 PROBLEM — M79.604 BILATERAL LEG PAIN: Status: ACTIVE | Noted: 2020-09-01

## 2020-09-01 PROBLEM — M79.605 BILATERAL LEG PAIN: Status: ACTIVE | Noted: 2020-09-01

## 2020-09-01 PROBLEM — G43.709 CHRONIC MIGRAINE WITHOUT AURA WITHOUT STATUS MIGRAINOSUS, NOT INTRACTABLE: Status: ACTIVE | Noted: 2020-09-01

## 2020-09-01 PROBLEM — E87.6 HYPOKALEMIA: Status: RESOLVED | Noted: 2020-07-27 | Resolved: 2020-09-01

## 2020-09-10 PROBLEM — I70.0 AORTIC ATHEROSCLEROSIS (HCC): Status: ACTIVE | Noted: 2020-09-10

## 2020-09-10 PROBLEM — I70.0 AORTIC ATHEROSCLEROSIS: Status: ACTIVE | Noted: 2020-09-10

## 2020-09-18 PROBLEM — M48.062 SPINAL STENOSIS OF LUMBAR REGION WITH NEUROGENIC CLAUDICATION: Status: ACTIVE | Noted: 2020-09-18

## 2020-09-18 PROBLEM — M51.369 DDD (DEGENERATIVE DISC DISEASE), LUMBAR: Status: ACTIVE | Noted: 2020-09-18

## 2020-09-18 PROBLEM — M51.36 DDD (DEGENERATIVE DISC DISEASE), LUMBAR: Status: ACTIVE | Noted: 2020-09-18

## 2020-10-12 PROBLEM — G89.29 CHRONIC LEFT-SIDED LOW BACK PAIN WITH SCIATICA: Status: ACTIVE | Noted: 2020-10-12

## 2020-10-12 PROBLEM — M54.40 CHRONIC LEFT-SIDED LOW BACK PAIN WITH SCIATICA: Status: ACTIVE | Noted: 2020-10-12

## 2020-10-12 PROBLEM — M54.50 LUMBAR PAIN: Status: ACTIVE | Noted: 2020-10-12

## 2020-10-12 PROBLEM — M53.86 DECREASED RANGE OF MOTION OF LUMBAR SPINE: Status: ACTIVE | Noted: 2020-10-12

## 2021-03-04 PROBLEM — M81.0 AGE-RELATED OSTEOPOROSIS WITHOUT CURRENT PATHOLOGICAL FRACTURE: Status: ACTIVE | Noted: 2021-03-04

## 2021-03-04 PROBLEM — I21.4 NSTEMI (NON-ST ELEVATED MYOCARDIAL INFARCTION) (HCC): Status: RESOLVED | Noted: 2020-07-27 | Resolved: 2021-03-04

## 2021-05-04 ENCOUNTER — HOSPITAL ENCOUNTER (EMERGENCY)
Facility: HOSPITAL | Age: 66
Discharge: HOME | End: 2021-05-04
Attending: EMERGENCY MEDICINE
Payer: MEDICARE

## 2021-05-04 ENCOUNTER — APPOINTMENT (OUTPATIENT)
Dept: CT IMAGING | Facility: HOSPITAL | Age: 66
End: 2021-05-04
Attending: EMERGENCY MEDICINE
Payer: MEDICARE

## 2021-05-04 ENCOUNTER — APPOINTMENT (OUTPATIENT)
Dept: GENERAL RADIOLOGY | Facility: HOSPITAL | Age: 66
End: 2021-05-04
Attending: EMERGENCY MEDICINE
Payer: MEDICARE

## 2021-05-04 VITALS
HEIGHT: 63 IN | HEART RATE: 64 BPM | DIASTOLIC BLOOD PRESSURE: 72 MMHG | SYSTOLIC BLOOD PRESSURE: 142 MMHG | TEMPERATURE: 97 F | OXYGEN SATURATION: 93 % | WEIGHT: 110 LBS | BODY MASS INDEX: 19.49 KG/M2 | RESPIRATION RATE: 21 BRPM

## 2021-05-04 DIAGNOSIS — I10 HYPERTENSION, UNSPECIFIED TYPE: Primary | ICD-10-CM

## 2021-05-04 DIAGNOSIS — G44.209 TENSION HEADACHE: ICD-10-CM

## 2021-05-04 PROCEDURE — 99285 EMERGENCY DEPT VISIT HI MDM: CPT

## 2021-05-04 PROCEDURE — 70450 CT HEAD/BRAIN W/O DYE: CPT | Performed by: EMERGENCY MEDICINE

## 2021-05-04 PROCEDURE — 81003 URINALYSIS AUTO W/O SCOPE: CPT | Performed by: EMERGENCY MEDICINE

## 2021-05-04 PROCEDURE — 80053 COMPREHEN METABOLIC PANEL: CPT | Performed by: EMERGENCY MEDICINE

## 2021-05-04 PROCEDURE — 85025 COMPLETE CBC W/AUTO DIFF WBC: CPT | Performed by: EMERGENCY MEDICINE

## 2021-05-04 PROCEDURE — 96376 TX/PRO/DX INJ SAME DRUG ADON: CPT

## 2021-05-04 PROCEDURE — 96374 THER/PROPH/DIAG INJ IV PUSH: CPT

## 2021-05-04 PROCEDURE — 93010 ELECTROCARDIOGRAM REPORT: CPT

## 2021-05-04 PROCEDURE — 71045 X-RAY EXAM CHEST 1 VIEW: CPT | Performed by: EMERGENCY MEDICINE

## 2021-05-04 PROCEDURE — 93005 ELECTROCARDIOGRAM TRACING: CPT

## 2021-05-04 RX ORDER — METOPROLOL SUCCINATE 25 MG/1
25 TABLET, EXTENDED RELEASE ORAL DAILY
Qty: 30 TABLET | Refills: 0 | Status: SHIPPED | OUTPATIENT
Start: 2021-05-04 | End: 2021-05-04

## 2021-05-04 RX ORDER — CARVEDILOL 6.25 MG/1
6.25 TABLET ORAL 2 TIMES DAILY WITH MEALS
Qty: 60 TABLET | Refills: 0 | Status: SHIPPED | OUTPATIENT
Start: 2021-05-04 | End: 2021-05-05

## 2021-05-04 RX ORDER — DILTIAZEM HYDROCHLORIDE 120 MG/1
120 CAPSULE, EXTENDED RELEASE ORAL DAILY
Status: DISCONTINUED | OUTPATIENT
Start: 2021-05-04 | End: 2021-05-04

## 2021-05-04 RX ORDER — ENALAPRIL MALEATE 10 MG/1
20 TABLET ORAL DAILY
Status: DISCONTINUED | OUTPATIENT
Start: 2021-05-04 | End: 2021-05-04

## 2021-05-04 RX ORDER — LABETALOL HYDROCHLORIDE 5 MG/ML
20 INJECTION, SOLUTION INTRAVENOUS ONCE
Status: COMPLETED | OUTPATIENT
Start: 2021-05-04 | End: 2021-05-04

## 2021-05-04 NOTE — ED PROVIDER NOTES
Patient Seen in: [facility] Emergency Department      History   Patient presents with:  Hypertension    Stated Complaint: HYPERTENSION 198/110, + HEADACHE, + BLURRY VISION     HPI/Subjective:   HPI    24-year-old female complaint of headache the pat rhythm without murmurs. Abdomen is soft and nontender without rebound or guarding. Extremities no clubbing cyanosis or edema. Skin there is no rash. Neurologic exam is within normal limits no focal deficits.     ED Course     Labs Reviewed   COMP METABO 5:30 PM       XR CHEST AP PORTABLE  (CPT=71045)    Result Date: 5/4/2021  CONCLUSION:  Hyperexpansion of the lungs. No acute pulmonary findings.     Dictated by (CST): Beth Wei MD on 5/04/2021 at 5:08 PM     Finalized by (CST): Beth Wei MD lumbar    !! - Potential duplicate medications found. Please discuss with provider.

## 2021-05-06 PROBLEM — G31.9 DIFFUSE BRAIN ATROPHY (HCC): Status: ACTIVE | Noted: 2021-05-06

## 2021-05-06 PROBLEM — Z72.0 TOBACCO USE: Status: ACTIVE | Noted: 2021-05-06

## 2021-05-06 PROBLEM — G31.9 DIFFUSE BRAIN ATROPHY: Status: ACTIVE | Noted: 2021-05-06

## 2021-05-26 PROBLEM — J44.9 COPD (CHRONIC OBSTRUCTIVE PULMONARY DISEASE) (HCC): Status: ACTIVE | Noted: 2021-05-26

## 2021-10-07 PROBLEM — F11.20 OPIOID DEPENDENCE WITH CURRENT USE (HCC): Status: ACTIVE | Noted: 2021-10-07

## 2022-02-14 PROBLEM — I34.0 MITRAL VALVE REGURGITATION: Status: ACTIVE | Noted: 2022-02-14

## 2022-02-14 PROBLEM — Z13.220 SCREENING, LIPID: Status: RESOLVED | Noted: 2022-02-14 | Resolved: 2022-02-14

## 2022-02-14 PROBLEM — Z13.220 SCREENING, LIPID: Status: ACTIVE | Noted: 2022-02-14

## 2023-05-19 ENCOUNTER — HOSPITAL ENCOUNTER (OUTPATIENT)
Dept: GENERAL RADIOLOGY | Age: 68
Discharge: HOME | End: 2023-05-19
Attending: FAMILY MEDICINE
Payer: MEDICARE

## 2023-05-19 DIAGNOSIS — R42 LIGHTHEADEDNESS: ICD-10-CM

## 2023-05-19 DIAGNOSIS — R53.1 WEAKNESS: ICD-10-CM

## 2023-05-19 DIAGNOSIS — J44.1 CHRONIC OBSTRUCTIVE PULMONARY DISEASE WITH (ACUTE) EXACERBATION (HCC): ICD-10-CM

## 2023-05-19 PROCEDURE — 71046 X-RAY EXAM CHEST 2 VIEWS: CPT | Performed by: FAMILY MEDICINE

## 2023-06-07 ENCOUNTER — HOSPITAL ENCOUNTER (EMERGENCY)
Facility: HOSPITAL | Age: 68
Discharge: LEFT BEFORE DISCHARGE | End: 2023-06-07
Payer: MEDICARE

## 2023-06-07 VITALS
HEIGHT: 63 IN | BODY MASS INDEX: 19.49 KG/M2 | RESPIRATION RATE: 16 BRPM | HEART RATE: 100 BPM | SYSTOLIC BLOOD PRESSURE: 164 MMHG | OXYGEN SATURATION: 99 % | WEIGHT: 110 LBS | TEMPERATURE: 97 F | DIASTOLIC BLOOD PRESSURE: 96 MMHG

## 2023-06-07 NOTE — ED INITIAL ASSESSMENT (HPI)
Pt arrives via EMS from home. Pt c/o chronic generalized pain. Pt states she normally takes tramadol but ran out and her PCP wont prescribe it to her. PT states her PCP referred her to pain management today but pt states \"I was in so much pain I couldn't drive. If I knew I had to wait I wouldn't have called an ambulance, oh the pain I cant take it just give me a shot of morphine, just give me one tramadol please, I cant stand it no more. \"

## 2023-09-05 ENCOUNTER — EXTERNAL LAB (OUTPATIENT)
Dept: OTHER | Age: 68
End: 2023-09-05

## 2023-09-05 LAB
ABSOLUTE NRBC (AUTO): 0 X10'3/MICROL
ALBUMIN SERPL-MCNC: 4.4 G/DL (ref 3.5–5.7)
ALP SERPL-CCNC: 89 UNITS/L (ref 34–104)
ALT SERPL-CCNC: 26 UNITS/L (ref 7–52)
ANION GAP SERPL CALC-SCNC: 11 MMOL/L (ref 6.2–14.7)
APTT PPP: 30.4 SECOND (ref 21–39.8)
AST SERPL-CCNC: 20 UNITS/L (ref 13–39)
BASOPHILS # BLD: 0.06 X10'3/MICROL (ref 0–0.11)
BASOPHILS NFR BLD: 0 %
BILIRUB SERPL-MCNC: 0.5 MG/DL (ref 0.3–1)
BNP SERPL-MCNC: 60 PG/ML (ref 0–100)
BUN SERPL-MCNC: 23 MG/DL (ref 7–25)
CALCIUM SERPL-MCNC: 9.3 MG/DL (ref 8.6–10.4)
CHLORIDE SERPL-SCNC: 97 MEQ/L (ref 98–107)
CO2 SERPL-SCNC: 20 MEQ/L (ref 21–31)
CREAT SERPL-MCNC: 0.8 MG/DL (ref 0.6–1.2)
D DIMER PPP FEU-MCNC: 0.28 MCG FEUNITS/ML
EOSINOPHIL # BLD: 0.16 X10'3/MICROL (ref 0.04–0.32)
EOSINOPHIL NFR BLD: 1 %
ERYTHROCYTE [DISTWIDTH] IN BLOOD: 16.5 % (ref 11.5–14.5)
EST CRCL: 53.7 ML/MIN
GFR SERPLBLD SCHWARTZ-ARVRAT: 79.4 ML/MIN/1.73 M2
GLUCOSE SERPL-MCNC: 106 MG/DL (ref 70–99)
HCT VFR BLD CALC: 38 % (ref 38–50)
HGB BLD-MCNC: 13.2 G/DL (ref 12.1–16.4)
INR PPP: 0.9
LENGTH OF FAST TIME PATIENT: ABNORMAL H
LIPASE SERPL-CCNC: 58 UNITS/L (ref 11–82)
LYMPHOCYTES # BLD: 2.1 X10'3/MICROL (ref 1.1–3.3)
LYMPHOCYTES NFR BLD: 18 %
MCH RBC QN AUTO: 29.3 PG (ref 26–34)
MCHC RBC AUTO-ENTMCNC: 34.7 G/DL (ref 30.6–37)
MCV RBC AUTO: 84.4 FL (ref 79–98)
MONOCYTES # BLD: 0.86 X10'3/MICROL (ref 0.15–0.58)
MONOCYTES NFR BLD: 7 %
NEUTROPHILS # BLD: 8.4 X10'3/MICROL (ref 1.8–9)
NEUTROPHILS NFR BLD: 72 %
NRBC BLD MANUAL-RTO: 0 %
PLATELET # BLD: 395 X10'3/MICROL (ref 150–400)
POTASSIUM SERPL-SCNC: 4.3 MMOL/L (ref 3.5–5.1)
PROT SERPL-MCNC: 6.8 G/DL (ref 6.4–8.9)
PROTHROMBIN TIME: 12.6 SECOND (ref 11.9–14.6)
RBC # BLD: 4.5 X10'6/MICROL (ref 4–5.6)
SODIUM SERPL-SCNC: 128 MEQ/L (ref 133–144)
TROPONIN I SERPL DL<=0.01 NG/ML-MCNC: 49 PG/ML
WBC # BLD: 11.6 X10'3/MICROL (ref 4.5–11)

## 2023-09-13 ENCOUNTER — EXTERNAL RECORD (OUTPATIENT)
Dept: HEALTH INFORMATION MANAGEMENT | Facility: OTHER | Age: 68
End: 2023-09-13

## 2023-11-28 ENCOUNTER — OFFICE VISIT (OUTPATIENT)
Dept: FAMILY MEDICINE CLINIC | Facility: CLINIC | Age: 68
End: 2023-11-28
Payer: MEDICARE

## 2023-11-28 VITALS
SYSTOLIC BLOOD PRESSURE: 120 MMHG | OXYGEN SATURATION: 98 % | DIASTOLIC BLOOD PRESSURE: 70 MMHG | RESPIRATION RATE: 18 BRPM | HEART RATE: 100 BPM | WEIGHT: 121 LBS | HEIGHT: 61.5 IN | BODY MASS INDEX: 22.55 KG/M2

## 2023-11-28 DIAGNOSIS — M79.7 FIBROMYALGIA: Primary | ICD-10-CM

## 2023-11-28 PROBLEM — F41.9 ANXIETY: Status: ACTIVE | Noted: 2022-07-21

## 2023-11-28 PROBLEM — F13.20 BENZODIAZEPINE DEPENDENCE (HCC): Status: ACTIVE | Noted: 2023-10-17

## 2023-11-28 PROCEDURE — 99214 OFFICE O/P EST MOD 30 MIN: CPT | Performed by: STUDENT IN AN ORGANIZED HEALTH CARE EDUCATION/TRAINING PROGRAM

## 2023-11-28 RX ORDER — OMEPRAZOLE 20 MG/1
CAPSULE, DELAYED RELEASE ORAL
COMMUNITY
Start: 2023-10-07

## 2023-11-28 RX ORDER — FLUTICASONE FUROATE, UMECLIDINIUM BROMIDE AND VILANTEROL TRIFENATATE 100; 62.5; 25 UG/1; UG/1; UG/1
1 POWDER RESPIRATORY (INHALATION) DAILY
COMMUNITY
Start: 2023-09-24

## 2023-11-28 RX ORDER — NAPROXEN 250 MG/1
250 TABLET ORAL DAILY PRN
Qty: 7 TABLET | Refills: 0 | Status: SHIPPED | OUTPATIENT
Start: 2023-11-28

## 2023-12-01 ENCOUNTER — TELEPHONE (OUTPATIENT)
Dept: PAIN CLINIC | Facility: CLINIC | Age: 68
End: 2023-12-01

## 2023-12-01 NOTE — TELEPHONE ENCOUNTER
Patient calling wanting to know what  can prescribe for her \"severe fibromyalgia\". Patient continued to ask \"What do people take for fibromyalgia. He is a Pain Management doctor and should manage my pain. \" Advised patient this had been discuss at her OV today. Patient continue to ask the same questions. Please contact to further discuss.

## 2023-12-01 NOTE — TELEPHONE ENCOUNTER
D/W Dr Maria Guadalupe Marcelo who also suggested that pt can discuss tramadol management with Dr. Vinicio Caballero since he manages her alprazolam script. Otherwise, pt should be seen by an addiction specialist who can manage buprenorphine. Contacted pt to relay info above and reiterate info discussed during [address] Repeated to pt that Dr Maria Guadalupe Marcelo is not able to prescribe any medications for her d/t her benzo dependence, drug seeking behavior, and history of overuse. Reiterated to pt that she should see an addiction specialist to discuss buprenorphine, which may help w/ her pain sx. Pt asks what she should do in the mean time. Advised she can discuss a short term script w/ her PCP. Pt MARK.

## 2024-03-06 ENCOUNTER — NURSE TRIAGE (OUTPATIENT)
Dept: TELEHEALTH | Age: 69
End: 2024-03-06

## 2024-03-08 ENCOUNTER — APPOINTMENT (OUTPATIENT)
Dept: FAMILY MEDICINE | Age: 69
End: 2024-03-08

## 2024-05-23 ENCOUNTER — APPOINTMENT (OUTPATIENT)
Dept: CT IMAGING | Facility: HOSPITAL | Age: 69
End: 2024-05-23
Attending: EMERGENCY MEDICINE

## 2024-05-23 ENCOUNTER — HOSPITAL ENCOUNTER (EMERGENCY)
Facility: HOSPITAL | Age: 69
Discharge: HOME | End: 2024-05-23
Attending: EMERGENCY MEDICINE

## 2024-05-23 VITALS
BODY MASS INDEX: 20 KG/M2 | SYSTOLIC BLOOD PRESSURE: 145 MMHG | OXYGEN SATURATION: 99 % | HEART RATE: 72 BPM | TEMPERATURE: 98 F | RESPIRATION RATE: 16 BRPM | DIASTOLIC BLOOD PRESSURE: 57 MMHG | WEIGHT: 110 LBS

## 2024-05-23 DIAGNOSIS — R10.84 ABDOMINAL PAIN, GENERALIZED: Primary | ICD-10-CM

## 2024-05-23 DIAGNOSIS — K59.00 CONSTIPATION, UNSPECIFIED CONSTIPATION TYPE: ICD-10-CM

## 2024-05-23 DIAGNOSIS — I71.40 ABDOMINAL AORTIC ANEURYSM (AAA) WITHOUT RUPTURE, UNSPECIFIED PART (HCC): ICD-10-CM

## 2024-05-23 DIAGNOSIS — E87.1 HYPONATREMIA: ICD-10-CM

## 2024-05-23 LAB
ALBUMIN SERPL-MCNC: 3.7 G/DL (ref 3.4–5)
ALBUMIN/GLOB SERPL: 1.1 {RATIO} (ref 1–2)
ALP LIVER SERPL-CCNC: 92 U/L
ALT SERPL-CCNC: 30 U/L
ANION GAP SERPL CALC-SCNC: 7 MMOL/L (ref 0–18)
AST SERPL-CCNC: 16 U/L (ref 15–37)
BASOPHILS # BLD AUTO: 0.05 X10(3) UL (ref 0–0.2)
BASOPHILS NFR BLD AUTO: 0.4 %
BILIRUB SERPL-MCNC: 0.6 MG/DL (ref 0.1–2)
BILIRUB UR QL STRIP.AUTO: NEGATIVE
BUN BLD-MCNC: 14 MG/DL (ref 9–23)
CALCIUM BLD-MCNC: 8.8 MG/DL (ref 8.5–10.1)
CHLORIDE SERPL-SCNC: 96 MMOL/L (ref 98–112)
CLARITY UR REFRACT.AUTO: CLEAR
CO2 SERPL-SCNC: 25 MMOL/L (ref 21–32)
CREAT BLD-MCNC: 0.87 MG/DL
EGFRCR SERPLBLD CKD-EPI 2021: 71 ML/MIN/1.73M2 (ref 60–?)
EOSINOPHIL # BLD AUTO: 0.12 X10(3) UL (ref 0–0.7)
EOSINOPHIL NFR BLD AUTO: 1 %
ERYTHROCYTE [DISTWIDTH] IN BLOOD BY AUTOMATED COUNT: 14.2 %
GLOBULIN PLAS-MCNC: 3.3 G/DL (ref 2.8–4.4)
GLUCOSE BLD-MCNC: 137 MG/DL (ref 70–99)
GLUCOSE UR STRIP.AUTO-MCNC: NORMAL MG/DL
HCT VFR BLD AUTO: 39.1 %
HGB BLD-MCNC: 13.6 G/DL
IMM GRANULOCYTES # BLD AUTO: 0.04 X10(3) UL (ref 0–1)
IMM GRANULOCYTES NFR BLD: 0.3 %
KETONES UR STRIP.AUTO-MCNC: NEGATIVE MG/DL
LEUKOCYTE ESTERASE UR QL STRIP.AUTO: NEGATIVE
LIPASE SERPL-CCNC: 50 U/L (ref 13–75)
LYMPHOCYTES # BLD AUTO: 1.47 X10(3) UL (ref 1–4)
LYMPHOCYTES NFR BLD AUTO: 12.4 %
MAGNESIUM SERPL-MCNC: 2.2 MG/DL (ref 1.6–2.6)
MCH RBC QN AUTO: 30.8 PG (ref 26–34)
MCHC RBC AUTO-ENTMCNC: 34.8 G/DL (ref 31–37)
MCV RBC AUTO: 88.7 FL
MONOCYTES # BLD AUTO: 0.74 X10(3) UL (ref 0.1–1)
MONOCYTES NFR BLD AUTO: 6.3 %
NEUTROPHILS # BLD AUTO: 9.42 X10 (3) UL (ref 1.5–7.7)
NEUTROPHILS # BLD AUTO: 9.42 X10(3) UL (ref 1.5–7.7)
NEUTROPHILS NFR BLD AUTO: 79.6 %
NITRITE UR QL STRIP.AUTO: NEGATIVE
OSMOLALITY SERPL CALC.SUM OF ELEC: 269 MOSM/KG (ref 275–295)
PH UR STRIP.AUTO: 5.5 [PH] (ref 5–8)
PLATELET # BLD AUTO: 308 10(3)UL (ref 150–450)
POTASSIUM SERPL-SCNC: 4.2 MMOL/L (ref 3.5–5.1)
PROT SERPL-MCNC: 7 G/DL (ref 6.4–8.2)
PROT UR STRIP.AUTO-MCNC: NEGATIVE MG/DL
RBC # BLD AUTO: 4.41 X10(6)UL
RBC UR QL AUTO: NEGATIVE
SODIUM SERPL-SCNC: 128 MMOL/L (ref 136–145)
SP GR UR STRIP.AUTO: 1.01 (ref 1–1.03)
TSI SER-ACNC: 0.41 MIU/ML (ref 0.36–3.74)
UROBILINOGEN UR STRIP.AUTO-MCNC: NORMAL MG/DL
WBC # BLD AUTO: 11.8 X10(3) UL (ref 4–11)

## 2024-05-23 PROCEDURE — 96361 HYDRATE IV INFUSION ADD-ON: CPT

## 2024-05-23 PROCEDURE — 84443 ASSAY THYROID STIM HORMONE: CPT | Performed by: EMERGENCY MEDICINE

## 2024-05-23 PROCEDURE — 83735 ASSAY OF MAGNESIUM: CPT | Performed by: EMERGENCY MEDICINE

## 2024-05-23 PROCEDURE — 99285 EMERGENCY DEPT VISIT HI MDM: CPT

## 2024-05-23 PROCEDURE — 99284 EMERGENCY DEPT VISIT MOD MDM: CPT

## 2024-05-23 PROCEDURE — 83690 ASSAY OF LIPASE: CPT | Performed by: EMERGENCY MEDICINE

## 2024-05-23 PROCEDURE — 96374 THER/PROPH/DIAG INJ IV PUSH: CPT

## 2024-05-23 PROCEDURE — 74177 CT ABD & PELVIS W/CONTRAST: CPT | Performed by: EMERGENCY MEDICINE

## 2024-05-23 PROCEDURE — 85025 COMPLETE CBC W/AUTO DIFF WBC: CPT | Performed by: EMERGENCY MEDICINE

## 2024-05-23 PROCEDURE — 85025 COMPLETE CBC W/AUTO DIFF WBC: CPT

## 2024-05-23 PROCEDURE — 83690 ASSAY OF LIPASE: CPT

## 2024-05-23 PROCEDURE — 81003 URINALYSIS AUTO W/O SCOPE: CPT | Performed by: EMERGENCY MEDICINE

## 2024-05-23 PROCEDURE — S0028 INJECTION, FAMOTIDINE, 20 MG: HCPCS | Performed by: EMERGENCY MEDICINE

## 2024-05-23 PROCEDURE — 80053 COMPREHEN METABOLIC PANEL: CPT

## 2024-05-23 PROCEDURE — 80053 COMPREHEN METABOLIC PANEL: CPT | Performed by: EMERGENCY MEDICINE

## 2024-05-23 RX ORDER — FAMOTIDINE 10 MG/ML
20 INJECTION, SOLUTION INTRAVENOUS ONCE
Status: COMPLETED | OUTPATIENT
Start: 2024-05-23 | End: 2024-05-23

## 2024-05-23 RX ORDER — BISACODYL 5 MG/1
5 TABLET, DELAYED RELEASE ORAL
Qty: 30 TABLET | Refills: 0 | Status: ON HOLD | OUTPATIENT
Start: 2024-05-23 | End: 2024-05-28

## 2024-05-23 RX ORDER — DOCUSATE SODIUM 100 MG/1
100 CAPSULE, LIQUID FILLED ORAL 2 TIMES DAILY
Qty: 60 CAPSULE | Refills: 0 | Status: SHIPPED | OUTPATIENT
Start: 2024-05-23 | End: 2024-06-22

## 2024-05-23 NOTE — ED INITIAL ASSESSMENT (HPI)
Pt c/o of abd pain x several months. She states her \"stomach is on fire\" and she feels like she is shaking inside. She can't eat/drink or take meds. No hx of abd surgeries. Pt states it's not her ulcer she has had it for years.

## 2024-05-23 NOTE — ED PROVIDER NOTES
Patient Seen in: [facility] Emergency Department      History     Chief Complaint   Patient presents with    Abdomen/Flank Pain     Stated Complaint: \"stomach is on fire and body is shaking\"    Subjective:   69-year-old female, history of fibromyalgia, anxiety, hypertension, opiate and benzodiazepine dependence, presents with abdominal pain.  Patient states that for 6 months she has had burning in her abdomen.  States in the burning flares occur she gets shaking throughout her whole body.  States she is eating and drinking fine.  She is no constipation or diarrhea.  No GI bleeding.  No nausea or vomiting.  No fevers.  Patient is a very poor historian therefore difficult HPI/ROS.            Objective:   Past Medical History:    Anxiety state, unspecified    Fibromyalgia    High blood pressure    Unspecified essential hypertension              History reviewed. No pertinent surgical history.             Social History     Socioeconomic History    Marital status: Single   Tobacco Use    Smoking status: Every Day     Current packs/day: 0.50     Average packs/day: 0.5 packs/day for 40.0 years (20.0 ttl pk-yrs)     Types: Cigarettes    Smokeless tobacco: Current   Vaping Use    Vaping status: Never Used   Substance and Sexual Activity    Alcohol use: No    Drug use: No     Social Determinants of Health      Received from [facility]              Review of Systems   Constitutional:  Negative for fever.   Respiratory:  Negative for shortness of breath.    Cardiovascular:  Negative for chest pain.   Gastrointestinal:  Positive for abdominal pain. Negative for blood in stool, constipation, diarrhea, nausea and vomiting.   Genitourinary:  Negative for dysuria.   Musculoskeletal:  Negative for back pain.       Positive for stated complaint: \"stomach is on fire and body is shaking\"  Other systems are as noted in HPI.  Constitutional and vital signs reviewed.      All other systems reviewed and negative  except as noted above.    Physical Exam     ED Triage Vitals [05/23/24 1048]   /66   Pulse 79   Resp 18   Temp 98.1 °F (36.7 °C)   Temp src    SpO2 98 %   O2 Device None (Room air)       Current Vitals:   Vital Signs  BP: 145/57  Pulse: 72  Resp: 16  Temp: 98.1 °F (36.7 °C)  MAP (mmHg): 72    Oxygen Therapy  SpO2: 99 %  O2 Device: None (Room air)            Physical Exam  Vitals and nursing note reviewed.   Constitutional:       Appearance: She is not toxic-appearing.   HENT:      Head: Normocephalic.   Cardiovascular:      Rate and Rhythm: Normal rate and regular rhythm.   Pulmonary:      Effort: Pulmonary effort is normal.      Breath sounds: Normal breath sounds.   Abdominal:      General: Bowel sounds are normal.      Palpations: Abdomen is soft.      Tenderness: There is no abdominal tenderness. There is no right CVA tenderness, left CVA tenderness, guarding or rebound. Negative signs include Hollingsworth's sign and McBurney's sign.      Hernia: No hernia is present.   Skin:     General: Skin is warm and dry.   Neurological:      General: No focal deficit present.      Mental Status: She is alert.   Psychiatric:         Mood and Affect: Mood is anxious.               ED Course     Labs Reviewed   COMP METABOLIC PANEL (14) - Abnormal; Notable for the following components:       Result Value    Glucose 137 (*)     Sodium 128 (*)     Chloride 96 (*)     Calculated Osmolality 269 (*)     All other components within normal limits   CBC W/ DIFFERENTIAL - Abnormal; Notable for the following components:    WBC 11.8 (*)     Neutrophil Absolute Prelim 9.42 (*)     Neutrophil Absolute 9.42 (*)     All other components within normal limits   LIPASE - Normal   MAGNESIUM - Normal   TSH W REFLEX TO FREE T4 - Normal   CBC WITH DIFFERENTIAL WITH PLATELET    Narrative:     The following orders were created for panel order CBC With Differential With Platelet.  Procedure                               Abnormality         Status                      ---------                               -----------         ------                     CBC W/ DIFFERENTIAL[[id number]]          Abnormal            Final result                 Please view results for these tests on the individual orders.   URINALYSIS, ROUTINE   RAINBOW DRAW BLUE   RAINBOW DRAW LIGHT GREEN   RAINBOW DRAW LAVENDER                      MDM      CT ABDOMEN+PELVIS(CONTRAST ONLY)(CPT=74177)    Result Date: 5/23/2024  CONCLUSION:  1. Large amount of stool seen throughout the colon without obstruction.  Findings may represent constipation.  No wall thickening.  No acute bowel disease. 2. Marked vasculopathy is described above.  Stable mid abdominal aortic aneurysm measuring 3.2 cm.  3. Mildly distended bladder with mild wall thickening may represent cystitis.    LOCATION:  [place] [address]   Dictated by (CST): Carlos Pena MD on 5/23/2024 at 3:45 PM     Finalized by (CST): Carlos Pena MD on 5/23/2024 at 3:53 PM        I independently interpreted the CT abdomen pelvis and note constipation with no obvious signs of obstruction    Differential diagnosis includes, but not limited to, constipation, GERD, peptic ulcer disease, gastritis, pancreatitis    External chart review demonstrates telephone encounters yesterday with nurse, office visit about a week ago with PCP regarding her GERD    69-year-old female presents with abdominal burning, on and off for months.  Has not sought GI attention yet.  Discharge PCP about a week ago.  History of peptic ulcer disease.  She was prescribed Protonix last week and has not filled it yet but was given a several month supply.  She is on omeprazole at home.  Likely there also was some concern for drug-seeking behavior with her PCP last week and was encouraged to follow-up with pain management.  She is resting in no distress here, will discharge home, she is benign abdomen vital signs are stable.  Some hyponatremia and that was corrected with IV fluids.  She  can repeat outpatient labs next week with her PCP.  If needed.  She is tolerating p.o. patient not vomiting.  Not having diarrhea.  She is stable abdominal aortic aneurysm.  She is discharged home, return precaution provided, shared decision making utilized after discussion of risk benefits alternatives    Patient was screened and evaluated during this visit.  As the treating physician attending to the patient, I determined within reasonable clinical confidence and prior to discharge, that an emergency medical condition was not or was no longer present.  There was no indication for further evaluation, treatment, or admission on an emergency basis.  Comprehensive verbal and written discharge and follow-up instructions were provided to help prevent relapse or worsening.  Patient was instructed to follow-up with their primary care provider for further evaluation and treatment, return immediately to ER for worsening, concerning, new, or changing/persisting symptoms. I discussed the case with the patient and they had no questions, complaints, or concerns.  Patient was comfortable going home.     Per the discharge paperwork, patients are encouraged to and given instructions on how to sign [facility] for [facility], where they have access to their records, including any/all incidental findings.     This note was prepared using Dragon Medical voice recognition dictation software. As a result errors may occur. When identified these errors have been corrected. While every attempt is made to correct errors during dictation discrepancies may still exist    Note to patient: The 21st Century Cures Act makes medical notes like these available to patients in the interest of transparency. However, this is a medical document intended as peer to peer communication. It is written in medical language and may contain abbreviations or verbiage that are unfamiliar. It may appear blunt or direct. Medical documents are intended to carry relevant  information, facts as evident, and the clinical opinion of the practitioner.                                      Medical Decision Making      Disposition and Plan     Clinical Impression:  1. Abdominal pain, generalized    2. Constipation, unspecified constipation type    3. Abdominal aortic aneurysm (AAA) without rupture, unspecified part (HCC)    4. Hyponatremia         Disposition:  Discharge  5/23/2024  4:11 pm    Follow-up:  Shira Cardenas, CIERRA  [address]  [place] [address]  [phone]    Follow up      Arthur Turner MD  [address]  [facility] [address] [phone]    Follow up  As needed          Medications Prescribed:  Current Discharge Medication List        START taking these medications    Details   docusate sodium 100 MG Oral Cap Take 1 capsule (100 mg total) by mouth 2 (two) times daily.  Qty: 60 capsule, Refills: 0      bisacodyl 5 MG Oral Tab EC Take 1 tablet (5 mg total) by mouth daily as needed for constipation (constipaton).  Qty: 30 tablet, Refills: 0

## 2024-05-27 ENCOUNTER — HOSPITAL ENCOUNTER (INPATIENT)
Facility: HOSPITAL | Age: 69
LOS: 2 days | Discharge: HOME | DRG: 392 | End: 2024-05-29
Attending: EMERGENCY MEDICINE | Admitting: INTERNAL MEDICINE
Payer: MEDICARE

## 2024-05-27 ENCOUNTER — APPOINTMENT (OUTPATIENT)
Dept: GENERAL RADIOLOGY | Facility: HOSPITAL | Age: 69
DRG: 392 | End: 2024-05-27
Attending: EMERGENCY MEDICINE
Payer: MEDICARE

## 2024-05-27 ENCOUNTER — APPOINTMENT (OUTPATIENT)
Dept: ULTRASOUND IMAGING | Facility: HOSPITAL | Age: 69
DRG: 392 | End: 2024-05-27
Attending: EMERGENCY MEDICINE
Payer: MEDICARE

## 2024-05-27 DIAGNOSIS — R10.13 EPIGASTRIC PAIN: Primary | ICD-10-CM

## 2024-05-27 PROBLEM — R79.89 ELEVATED TROPONIN: Status: ACTIVE | Noted: 2024-05-27

## 2024-05-27 LAB
ALBUMIN SERPL-MCNC: 3.8 G/DL (ref 3.4–5)
ALBUMIN/GLOB SERPL: 1.3 {RATIO} (ref 1–2)
ALP LIVER SERPL-CCNC: 92 U/L
ALT SERPL-CCNC: 30 U/L
ANION GAP SERPL CALC-SCNC: 10 MMOL/L (ref 0–18)
AST SERPL-CCNC: 20 U/L (ref 15–37)
BASOPHILS # BLD AUTO: 0.04 X10(3) UL (ref 0–0.2)
BASOPHILS NFR BLD AUTO: 0.4 %
BILIRUB SERPL-MCNC: 0.7 MG/DL (ref 0.1–2)
BUN BLD-MCNC: 13 MG/DL (ref 9–23)
CALCIUM BLD-MCNC: 9.2 MG/DL (ref 8.5–10.1)
CHLORIDE SERPL-SCNC: 95 MMOL/L (ref 98–112)
CHOLEST SERPL-MCNC: 222 MG/DL (ref ?–200)
CO2 SERPL-SCNC: 22 MMOL/L (ref 21–32)
CREAT BLD-MCNC: 0.75 MG/DL
EGFRCR SERPLBLD CKD-EPI 2021: 85 ML/MIN/1.73M2 (ref 60–?)
EOSINOPHIL # BLD AUTO: 0.11 X10(3) UL (ref 0–0.7)
EOSINOPHIL NFR BLD AUTO: 1 %
ERYTHROCYTE [DISTWIDTH] IN BLOOD BY AUTOMATED COUNT: 14 %
GLOBULIN PLAS-MCNC: 3 G/DL (ref 2.8–4.4)
GLUCOSE BLD-MCNC: 110 MG/DL (ref 70–99)
HCT VFR BLD AUTO: 39.2 %
HDLC SERPL-MCNC: 86 MG/DL (ref 40–59)
HGB BLD-MCNC: 13.9 G/DL
IMM GRANULOCYTES # BLD AUTO: 0.04 X10(3) UL (ref 0–1)
IMM GRANULOCYTES NFR BLD: 0.4 %
LDLC SERPL CALC-MCNC: 122 MG/DL (ref ?–100)
LIPASE SERPL-CCNC: 49 U/L (ref 13–75)
LYMPHOCYTES # BLD AUTO: 2.01 X10(3) UL (ref 1–4)
LYMPHOCYTES NFR BLD AUTO: 17.9 %
MCH RBC QN AUTO: 30.8 PG (ref 26–34)
MCHC RBC AUTO-ENTMCNC: 35.5 G/DL (ref 31–37)
MCV RBC AUTO: 86.9 FL
MONOCYTES # BLD AUTO: 0.68 X10(3) UL (ref 0.1–1)
MONOCYTES NFR BLD AUTO: 6 %
NEUTROPHILS # BLD AUTO: 8.36 X10 (3) UL (ref 1.5–7.7)
NEUTROPHILS # BLD AUTO: 8.36 X10(3) UL (ref 1.5–7.7)
NEUTROPHILS NFR BLD AUTO: 74.3 %
NONHDLC SERPL-MCNC: 136 MG/DL (ref ?–130)
OSMOLALITY SERPL CALC.SUM OF ELEC: 265 MOSM/KG (ref 275–295)
PLATELET # BLD AUTO: 323 10(3)UL (ref 150–450)
POTASSIUM SERPL-SCNC: 3.4 MMOL/L (ref 3.5–5.1)
PROT SERPL-MCNC: 6.8 G/DL (ref 6.4–8.2)
RBC # BLD AUTO: 4.51 X10(6)UL
SODIUM SERPL-SCNC: 127 MMOL/L (ref 136–145)
TRIGL SERPL-MCNC: 83 MG/DL (ref 30–149)
TROPONIN I SERPL HS-MCNC: 228 NG/L
TROPONIN I SERPL HS-MCNC: 286 NG/L
VLDLC SERPL CALC-MCNC: 15 MG/DL (ref 0–30)
WBC # BLD AUTO: 11.2 X10(3) UL (ref 4–11)

## 2024-05-27 PROCEDURE — 83690 ASSAY OF LIPASE: CPT

## 2024-05-27 PROCEDURE — 93005 ELECTROCARDIOGRAM TRACING: CPT

## 2024-05-27 PROCEDURE — 99285 EMERGENCY DEPT VISIT HI MDM: CPT

## 2024-05-27 PROCEDURE — 80061 LIPID PANEL: CPT | Performed by: EMERGENCY MEDICINE

## 2024-05-27 PROCEDURE — 36415 COLL VENOUS BLD VENIPUNCTURE: CPT

## 2024-05-27 PROCEDURE — 71045 X-RAY EXAM CHEST 1 VIEW: CPT | Performed by: EMERGENCY MEDICINE

## 2024-05-27 PROCEDURE — 80053 COMPREHEN METABOLIC PANEL: CPT

## 2024-05-27 PROCEDURE — 76700 US EXAM ABDOM COMPLETE: CPT | Performed by: EMERGENCY MEDICINE

## 2024-05-27 PROCEDURE — 84484 ASSAY OF TROPONIN QUANT: CPT | Performed by: EMERGENCY MEDICINE

## 2024-05-27 PROCEDURE — 93010 ELECTROCARDIOGRAM REPORT: CPT

## 2024-05-27 PROCEDURE — 80053 COMPREHEN METABOLIC PANEL: CPT | Performed by: EMERGENCY MEDICINE

## 2024-05-27 PROCEDURE — 85025 COMPLETE CBC W/AUTO DIFF WBC: CPT | Performed by: EMERGENCY MEDICINE

## 2024-05-27 PROCEDURE — 83690 ASSAY OF LIPASE: CPT | Performed by: EMERGENCY MEDICINE

## 2024-05-27 PROCEDURE — 85025 COMPLETE CBC W/AUTO DIFF WBC: CPT

## 2024-05-27 RX ORDER — ACETAMINOPHEN 500 MG
500 TABLET ORAL EVERY 4 HOURS PRN
Status: DISCONTINUED | OUTPATIENT
Start: 2024-05-27 | End: 2024-05-29

## 2024-05-27 RX ORDER — ASPIRIN 81 MG/1
324 TABLET, CHEWABLE ORAL ONCE
Status: DISCONTINUED | OUTPATIENT
Start: 2024-05-27 | End: 2024-05-29

## 2024-05-27 RX ORDER — ONDANSETRON 2 MG/ML
4 INJECTION INTRAMUSCULAR; INTRAVENOUS EVERY 6 HOURS PRN
Status: DISCONTINUED | OUTPATIENT
Start: 2024-05-27 | End: 2024-05-29

## 2024-05-27 RX ORDER — ONDANSETRON 2 MG/ML
INJECTION INTRAMUSCULAR; INTRAVENOUS
Status: COMPLETED
Start: 2024-05-27 | End: 2024-05-27

## 2024-05-27 NOTE — ED INITIAL ASSESSMENT (HPI)
Patient demanding food and water, aware she is unable to have anything to eat or drink d/t her abdominal pain.    Patient walking out of waiting room to find gum or candy.

## 2024-05-27 NOTE — ED QUICK NOTES
Patient moving herself around in the waiting room in a wheelchair, speaking loudly.  Patient was reminded that she needs to stay in one place for her safety and the safety of other patients.    Patient currently eating in waiting room.

## 2024-05-27 NOTE — ED INITIAL ASSESSMENT (HPI)
Pt was seen in the ED 5/23/24 for the same issue. She states the Dr diagnosed her with an ulcer, but she knows that's not the problem. Pt states a Dr at her pain clinic told her she has gallstones and \"they are probably all infected.\" Pt c/o continuous pain in  upper abd. Denies N/V.

## 2024-05-28 ENCOUNTER — APPOINTMENT (OUTPATIENT)
Dept: CV DIAGNOSTICS | Facility: HOSPITAL | Age: 69
DRG: 392 | End: 2024-05-28
Attending: INTERNAL MEDICINE
Payer: MEDICARE

## 2024-05-28 ENCOUNTER — APPOINTMENT (OUTPATIENT)
Dept: MRI IMAGING | Facility: HOSPITAL | Age: 69
DRG: 392 | End: 2024-05-28
Attending: INTERNAL MEDICINE
Payer: MEDICARE

## 2024-05-28 LAB
ATRIAL RATE: 69 BPM
P AXIS: 50 DEGREES
P-R INTERVAL: 144 MS
Q-T INTERVAL: 440 MS
QRS DURATION: 72 MS
QTC CALCULATION (BEZET): 471 MS
R AXIS: 6 DEGREES
T AXIS: 71 DEGREES
VENTRICULAR RATE: 69 BPM

## 2024-05-28 PROCEDURE — 97535 SELF CARE MNGMENT TRAINING: CPT

## 2024-05-28 PROCEDURE — 93306 TTE W/DOPPLER COMPLETE: CPT | Performed by: INTERNAL MEDICINE

## 2024-05-28 PROCEDURE — A9575 INJ GADOTERATE MEGLUMI 0.1ML: HCPCS | Performed by: HOSPITALIST

## 2024-05-28 PROCEDURE — 93018 CV STRESS TEST I&R ONLY: CPT | Performed by: INTERNAL MEDICINE

## 2024-05-28 PROCEDURE — 78452 HT MUSCLE IMAGE SPECT MULT: CPT | Performed by: INTERNAL MEDICINE

## 2024-05-28 PROCEDURE — 97530 THERAPEUTIC ACTIVITIES: CPT

## 2024-05-28 PROCEDURE — 74183 MRI ABD W/O CNTR FLWD CNTR: CPT | Performed by: INTERNAL MEDICINE

## 2024-05-28 PROCEDURE — 76376 3D RENDER W/INTRP POSTPROCES: CPT | Performed by: INTERNAL MEDICINE

## 2024-05-28 PROCEDURE — 97165 OT EVAL LOW COMPLEX 30 MIN: CPT

## 2024-05-28 PROCEDURE — 97161 PT EVAL LOW COMPLEX 20 MIN: CPT

## 2024-05-28 PROCEDURE — 93017 CV STRESS TEST TRACING ONLY: CPT | Performed by: INTERNAL MEDICINE

## 2024-05-28 PROCEDURE — 92610 EVALUATE SWALLOWING FUNCTION: CPT

## 2024-05-28 RX ORDER — DIPHENHYDRAMINE HYDROCHLORIDE AND LIDOCAINE HYDROCHLORIDE AND ALUMINUM HYDROXIDE AND MAGNESIUM HYDRO
10 KIT EVERY 6 HOURS PRN
Status: DISCONTINUED | OUTPATIENT
Start: 2024-05-28 | End: 2024-05-29

## 2024-05-28 RX ORDER — ALBUTEROL SULFATE 90 UG/1
2 AEROSOL, METERED RESPIRATORY (INHALATION) EVERY 6 HOURS PRN
Status: DISCONTINUED | OUTPATIENT
Start: 2024-05-28 | End: 2024-05-29

## 2024-05-28 RX ORDER — ALPRAZOLAM 0.25 MG/1
0.5 TABLET ORAL 3 TIMES DAILY
Status: DISCONTINUED | OUTPATIENT
Start: 2024-05-28 | End: 2024-05-29

## 2024-05-28 RX ORDER — FAMOTIDINE 20 MG/1
20 TABLET, FILM COATED ORAL 2 TIMES DAILY
Status: DISCONTINUED | OUTPATIENT
Start: 2024-05-28 | End: 2024-05-29

## 2024-05-28 RX ORDER — PANTOPRAZOLE SODIUM 40 MG/1
40 TABLET, DELAYED RELEASE ORAL
Status: DISCONTINUED | OUTPATIENT
Start: 2024-05-28 | End: 2024-05-28

## 2024-05-28 RX ORDER — ENALAPRIL MALEATE 10 MG/1
20 TABLET ORAL DAILY
Status: DISCONTINUED | OUTPATIENT
Start: 2024-05-28 | End: 2024-05-29

## 2024-05-28 RX ORDER — LORAZEPAM 2 MG/ML
0.5 INJECTION INTRAMUSCULAR ONCE
Status: COMPLETED | OUTPATIENT
Start: 2024-05-28 | End: 2024-05-28

## 2024-05-28 RX ORDER — HEPARIN SODIUM 5000 [USP'U]/ML
5000 INJECTION, SOLUTION INTRAVENOUS; SUBCUTANEOUS EVERY 8 HOURS SCHEDULED
Status: DISCONTINUED | OUTPATIENT
Start: 2024-05-28 | End: 2024-05-29

## 2024-05-28 RX ORDER — DIPHENHYDRAMINE HYDROCHLORIDE 50 MG/ML
10 INJECTION, SOLUTION INTRAMUSCULAR; INTRAVENOUS
Status: COMPLETED | OUTPATIENT
Start: 2024-05-28 | End: 2024-05-28

## 2024-05-28 RX ORDER — ENALAPRIL MALEATE 10 MG/1
20 TABLET ORAL 2 TIMES DAILY
Status: DISCONTINUED | OUTPATIENT
Start: 2024-05-28 | End: 2024-05-28

## 2024-05-28 RX ORDER — REGADENOSON 0.08 MG/ML
INJECTION, SOLUTION INTRAVENOUS
Status: COMPLETED
Start: 2024-05-28 | End: 2024-05-28

## 2024-05-28 RX ORDER — AMINOPHYLLINE 25 MG/ML
INJECTION, SOLUTION INTRAVENOUS
Status: COMPLETED
Start: 2024-05-28 | End: 2024-05-28

## 2024-05-28 RX ORDER — SUCRALFATE ORAL 1 G/10ML
1 SUSPENSION ORAL
Status: DISCONTINUED | OUTPATIENT
Start: 2024-05-28 | End: 2024-05-29

## 2024-05-28 RX ORDER — AMLODIPINE BESYLATE 5 MG/1
5 TABLET ORAL 2 TIMES DAILY
Status: DISCONTINUED | OUTPATIENT
Start: 2024-05-28 | End: 2024-05-29

## 2024-05-28 RX ORDER — POTASSIUM CHLORIDE 20 MEQ/1
40 TABLET, EXTENDED RELEASE ORAL ONCE
Status: COMPLETED | OUTPATIENT
Start: 2024-05-28 | End: 2024-05-28

## 2024-05-28 RX ORDER — NICOTINE 21 MG/24HR
1 PATCH, TRANSDERMAL 24 HOURS TRANSDERMAL DAILY
Status: DISCONTINUED | OUTPATIENT
Start: 2024-05-28 | End: 2024-05-29

## 2024-05-28 NOTE — PLAN OF CARE
Assumed care at 2230. A&Ox3, confused at times. On RA. NSR on tele. NPO. Non-cardiac electrolyte protocol, K+ replaced per protocol. PIV L AC saline locked. Pt complains of pain, refuses PRN Tylenol. Purewick in place. Bed in lowest position, call light within reach. All needs met at this time. Will continue with plan of care.       Problem: Patient/Family Goals  Goal: Patient/Family Long Term Goal  Description: Patient's Long Term Goal: discharge    Interventions:  - follow POC  - See additional Care Plan goals for specific interventions  Outcome: Progressing  Goal: Patient/Family Short Term Goal  Description: Patient's Short Term Goal: pain management    Interventions:   - take PRN medications  - See additional Care Plan goals for specific interventions  Outcome: Progressing

## 2024-05-28 NOTE — PHYSICAL THERAPY NOTE
PHYSICAL THERAPY EVALUATION - INPATIENT     Room Number: 3610/3610-A  Evaluation Date: 5/28/2024  Type of Evaluation: Initial  Physician Order: PT Eval and Treat    Presenting Problem: Epigastric Pain  Co-Morbidities : Anxiety, fibromyalgia, HTN  Reason for Therapy: Mobility Dysfunction and Discharge Planning    PHYSICAL THERAPY ASSESSMENT   Patient is currently functioning at baseline with bed mobility, transfers, and gait.  Prior to admission, patient's baseline is IND.  At this time pt is observed to be  near baseline and has no IP PT goals.     Patient will benefit from continued skilled PT Services For duration of hospitalization, however, given the patient is functioning near baseline level do not anticipate skilled therapy needs at discharge .    PLAN           Patient has met all skilled IPPT goals at this time. Patient will be discharged from Physical Therapy services.  Please re-order if a new functional limitation presents during this admission.        PHYSICAL THERAPY MEDICAL/SOCIAL HISTORY  History related to current admission: Patient is a 69 year old female admitted on 5/27/2024 from Home for Epigastric Pain.     HOME SITUATION  Type of Home: House   Home Layout: One level                Lives With: Alone  Drives: Yes  Patient Owned Equipment: None       Prior Level of [place]: Pt states that she lives alone. Pt is IND with all ADLs and mobility.    SUBJECTIVE  \"My pain 100/10\"      OBJECTIVE  Precautions: None  Fall Risk: Standard fall risk    WEIGHT BEARING RESTRICTION  Weight Bearing Restriction: None                PAIN ASSESSMENT  Rating: Unable to rate  Location: Abdominal area  Management Techniques: Repositioning;Activity promotion    COGNITION  Overall Cognitive Status:  WFL - within functional limits  Pt was hyper focused on her stomach pain and was observed to repeat comments of pain that required pt to be redirected.      RANGE OF MOTION AND STRENGTH ASSESSMENT  Upper extremity  ROM and strength are within functional limits     Lower extremity ROM is within functional limits     Lower extremity strength is within functional limits       BALANCE  Static Sitting: Good  Dynamic Sitting: Good  Static Standing: Fair +  Dynamic Standing: Fair +    ADDITIONAL TESTS                                    ACTIVITY TOLERANCE                         O2 WALK       NEUROLOGICAL FINDINGS                        AM-PAC '6-Clicks' INPATIENT SHORT FORM - BASIC MOBILITY  How much difficulty does the patient currently have...  Patient Difficulty: Turning over in bed (including adjusting bedclothes, sheets and blankets)?: A Little   Patient Difficulty: Sitting down on and standing up from a chair with arms (e.g., wheelchair, bedside commode, etc.): A Little   Patient Difficulty: Moving from lying on back to sitting on the side of the bed?: A Little   How much help from another person does the patient currently need...   Help from Another: Moving to and from a bed to a chair (including a wheelchair)?: A Little   Help from Another: Need to walk in hospital room?: A Little   Help from Another: Climbing 3-5 steps with a railing?: A Little       AM-PAC Score:  Raw Score: 18   Approx Degree of Impairment: 46.58%   Standardized Score (AM-PAC Scale): 43.63   CMS Modifier (G-Code): CK    FUNCTIONAL ABILITY STATUS  Gait Assessment   Functional Mobility/Gait Assessment  Gait Assistance: Supervision  Distance (ft): 200  Assistive Device: Rolling walker  Pattern: Within Functional Limits    Skilled Therapy Provided     Bed Mobility:  Rolling: NT  Supine to sit: Mod I   Sit to supine: Mod I     Transfer Mobility:  Sit to stand: Mod I   Stand to sit: Mod I  Gait = Sup 200ft no assisted device    Therapist's Comments: Pt was observed with no LOB when ambulating. Pt required constantly to be redirected.     Exercise/Education Provided:  Bed mobility  Body mechanics  Gait training  Transfer training    Patient End of Session: Needs  met;Call light within reach;In bed;RN aware of session/findings;All patient questions and concerns addressed;Alarm set      Patient Evaluation Complexity Level:  History Moderate - 1 or 2 personal factors and/or co-morbidities   Examination of body systems Low - addressing 1-2 elements   Clinical Presentation Low - Stable   Clinical Decision Making Low - Stable       PT Session Time: 23 minutes  Therapeutic Activity: 15 minutes

## 2024-05-28 NOTE — PROGRESS NOTES
CARDIODIAGNOSTIC PRELIMINARY REPORT:    LEXISCAN completed, tolerated well    Second set of images pending

## 2024-05-28 NOTE — PLAN OF CARE
Assumed care at 0730  A/Ox3, forgetful, RA, VSS  Refuses tele  Cardiac/stress test diet   Pending MRI abd/MRCP  Non cardiac replacement   PRN nicotine gum  Safety measures in place    1405: pt got caught smoking a cigarette in the bathroom, asked to see patient's purse, locked up packet of cigarettes, tylenol bottles, & prescribed medication into locker 6, MD aware of incident     Problem: Patient/Family Goals  Goal: Patient/Family Long Term Goal  Description: Patient's Long Term Goal: dc home    Interventions:  - MRI  -stress test  - See additional Care Plan goals for specific interventions  Outcome: Progressing  Goal: Patient/Family Short Term Goal  Description: Patient's Short Term Goal:     Interventions:   -   - See additional Care Plan goals for specific interventions  Outcome: Progressing

## 2024-05-28 NOTE — SLP NOTE
ADULT SWALLOWING EVALUATION    ASSESSMENT    ASSESSMENT/OVERALL IMPRESSION:  Patient is a 68 yo female admitted with abdominal pain. PMH significant for hypertension, COPD, and GERD. SLP order received to evaluate oropharyngeal swallow due to reported modified consistency diet. Patient received alert in bed. She denied history of oropharyngeal dysphagia and reported consuming a regular diet and thin liquids at baseline. Patient is edentulous and avoids certain difficult to chew items.     Patient presented with a largely intact oropharyngeal swallow. Bolus acceptance was adequate without evidence of anterior bolus loss. Mastication and AP transit were thorough and efficient. Pharyngeal swallow initiation appeared timely. No overt s/s aspiration observed.     Recommend patient continue regular diet and thin liquids. She is able to choose softer items as needed independently. No further SLP services warranted at this time as no deficits identified which require skilled intervention. Education provided re: results and recommendations.         RECOMMENDATIONS   Diet Recommendations - Solids: Regular  Diet Recommendations - Liquids: Thin Liquids                              Medication Administration Recommendations: No restrictions  Treatment Plan/Recommendations: No further inpatient SLP service warranted    HISTORY   MEDICAL HISTORY  Reason for Referral: Altered diet consistency    Problem List  Principal Problem:    Epigastric pain  Active Problems:    Elevated troponin      Past Medical History  Past Medical History:    Anxiety state, unspecified    Fibromyalgia    High blood pressure    Unspecified essential hypertension       Prior Living Situation: Home alone  Diet Prior to Admission: Thin liquids;Regular       Patient/Family Goals: None stated.    SWALLOWING HISTORY  Current Diet Consistency: Regular;Thin liquids  Dysphagia History: As above  Imaging Results:   CXR 5/27/2024  CONCLUSION:  COPD changes.  No lobar  consolidation is seen to suggest pneumonia.         LOCATION:  [facility]                  Dictated by (CST): Stromberg, LeRoy, MD on 5/27/2024 at 10:51 PM       Finalized by (CST): Stromberg, LeRoy, MD on 5/27/2024 at 10:53 PM     SUBJECTIVE       OBJECTIVE   ORAL MOTOR EXAMINATION  Dentition: Edentulous  Symmetry: Within Functional Limits  Strength: Within Functional Limits  Tone: Within Functional Limits  Range of Motion: Within Functional Limits  Rate of Motion: Within Functional Limits    Voice Quality: Clear  Respiratory Status: Unlabored  Consistencies Trialed: Thin liquids;Hard solid  Method of Presentation: Self presentation  Patient Positioning: Upright;Midline    Oral Phase of Swallow: Within Functional Limits                      Pharyngeal Phase of Swallow: Within Functional Limits           (Please note: Silent aspiration cannot be evaluated clinically. Videofluoroscopic Swallow Study is required to rule-out silent aspiration.)    Esophageal Phase of Swallow: No complaints consistent with possible esophageal involvement  Comments: N/A              FOLLOW UP  Treatment Plan/Recommendations: No further inpatient SLP service warranted     Follow Up Needed (Documentation Required): No       Thank you for your referral.   If you have any questions, please contact [facility] ESTES

## 2024-05-28 NOTE — ED PROVIDER NOTES
Patient Seen in: [facility] Emergency Department      History     Chief Complaint   Patient presents with    Abdomen/Flank Pain     Stated Complaint: abdominal pain x 6 months.  25mcg fentanyl and 4mg zofran IV.  .  VSS enr*    Subjective:   HPI    Subjective difficult historian, she is fixated on a potential gallbladder problem.  She keeps redirecting to that.  She is here for burning epigastric pain, goes to her chest and throat.  She states been going for some time now, weeks if not months.    No shortness of breath no diaphoresis no lightheadedness syncope near syncope.  She was seen here by my partner couple weeks ago,       had a negative CT scan was discharged home.  She states has been taking her reflux medication but it has not helped and that is why she thinks it is her gallbladder.  Denies any right upper quadrant pain.  Constant not exertional.  No PND no orthopnea no swelling in her legsSymptoms       Objective:   Past Medical History:    Anxiety state, unspecified    Fibromyalgia    High blood pressure    Unspecified essential hypertension              History reviewed. No pertinent surgical history.             Social History     Socioeconomic History    Marital status: Single   Tobacco Use    Smoking status: Every Day     Current packs/day: 0.50     Average packs/day: 0.5 packs/day for 40.0 years (20.0 ttl pk-yrs)     Types: Cigarettes    Smokeless tobacco: Current   Vaping Use    Vaping status: Never Used   Substance and Sexual Activity    Alcohol use: No    Drug use: No     Social Determinants of Health      Received from [facility]              Review of Systems    Positive for stated complaint: abdominal pain x 6 months.  25mcg fentanyl and 4mg zofran IV.  .  VSS enr*  Other systems are as noted in HPI.  Constitutional and vital signs reviewed.      All other systems reviewed and negative except as noted above.    Physical Exam     ED Triage Vitals [05/27/24  1633]   /73   Pulse 77   Resp 20   Temp 97.4 °F (36.3 °C)   Temp src Temporal   SpO2 97 %   O2 Device None (Room air)       Current Vitals:   Vital Signs  BP: 118/73  Pulse: 77  Resp: 20  Temp: 97.4 °F (36.3 °C)  Temp src: Temporal    Oxygen Therapy  SpO2: 97 %  O2 Device: None (Room air)            Physical Exam    Physical Exam   Constitutional: Awake, alert, well appearing  Head: Normocephalic and atraumatic.   Eyes: Conjunctivae are normal. Pupils are equal, round, and reactive to light.   Neck: Normal range of motion. Neck supple.   Cardiovascular: Normal rate, regular rhythm  Pulmonary/Chest: Normal effort.  No accessory muscle use.  No clubbing, no cyanosis.  Abdominal: Soft. Bowel sounds are normal.   Neurological: Pt is alert and oriented to person, place, and time. no cranial nerve deficits  Skin: Skin is warm and dry.  Belly clinically benign multiple exams        ED Course     Labs Reviewed   COMP METABOLIC PANEL (14) - Abnormal; Notable for the following components:       Result Value    Glucose 110 (*)     Sodium 127 (*)     Potassium 3.4 (*)     Chloride 95 (*)     Calculated Osmolality 265 (*)     All other components within normal limits   TROPONIN I HIGH SENSITIVITY - Abnormal; Notable for the following components:    Troponin I (High Sensitivity) 286 (*)     All other components within normal limits   LIPID PANEL - Abnormal; Notable for the following components:    Cholesterol, Total 222 (*)     HDL Cholesterol 86 (*)     LDL Cholesterol 122 (*)     Non HDL Chol 136 (*)     All other components within normal limits   TROPONIN I HIGH SENSITIVITY - Abnormal; Notable for the following components:    Troponin I (High Sensitivity) 228 (*)     All other components within normal limits   CBC W/ DIFFERENTIAL - Abnormal; Notable for the following components:    WBC 11.2 (*)     Neutrophil Absolute Prelim 8.36 (*)     Neutrophil Absolute 8.36 (*)     All other components within normal limits   LIPASE  - Normal   CBC WITH DIFFERENTIAL WITH PLATELET    Narrative:     The following orders were created for panel order CBC With Differential With Platelet.  Procedure                               Abnormality         Status                     ---------                               -----------         ------                     CBC W/ DIFFERENTIAL[[id number]]          Abnormal            Final result                 Please view results for these tests on the individual orders.   RAINBOW DRAW LAVENDER   RAINBOW DRAW LIGHT GREEN   RAINBOW DRAW BLUE     EKG    Rate, intervals and axes as noted on EKG Report.  Rate: 69  Rhythm: Sinus Rhythm  Reading: Sinus rhythm without acute ischemia                 Initial troponin 286, repeat 228.  Remainder labs acceptable save for mild hyponatremia and hypokalemia.    US ABDOMEN COMPLETE (CPT=76700)    Result Date: 5/27/2024  CONCLUSION:   1. Equivocal 1 mm stone or gallbladder polyp.  2. Ectatic infrarenal abdominal aorta measuring 2.5 cm.  This is better characterized on the recent CT from 5/23/2024.   LOCATION:  [facility]    Dictated by (CST): Stromberg, LeRoy, MD on 5/27/2024 at 8:43 PM     Finalized by (CST): Stromberg, LeRoy, MD on 5/27/2024 at 8:45 PM               MDM          Differential diagnoses considered:   Atypical presentation of ACS, acute cholecystitis, however favoring reflux.    Troponin has been elevated in the past though not to this degree.  Repeat at troponin down trended significantly.  Suspicion for this representing ACS is very low though cannot entirely exclude it.    As such I will admit her for serial enzymes and cardiology consultation.    Patient will be admitted primarily to the [facility] hospitalist.    Plan of care discussed with Dr. Mays, agrees with plan.  Full dose aspirin, will hold off on heparin given low suspicion, downtrending troponin and a reported history of ulcers.    No acute cholecystitis on ultrasound      I visualized the radiology studies,  my independent interpretation:     *Discussion of ongoing management of this patient's care included:  exertion, cardiology  *Comorbidities contributing to the complexity of decision making: n/a  *External charts reviewed: 9/5/2023 reviewed, troponin was elevated at 49.  *Additional sources of history: n/a    Shared decision making was done by: patient, myself.    Admission disposition: 5/27/2024  8:22 PM                                        Medical Decision Making      Disposition and Plan     Clinical Impression:  1. Epigastric pain         Disposition:  Admit  5/27/2024  8:22 pm    Follow-up:  No follow-up provider specified.        Medications Prescribed:  Current Discharge Medication List                            Hospital Problems       Present on Admission  Date Reviewed: 5/10/2024            ICD-10-CM Noted POA    * (Principal) Epigastric pain R10.13 5/27/2024 Unknown
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
## 2024-05-28 NOTE — CONSULTS
[facility] Cardiology  Consultation Note      Quyen Wolf Patient Status:  Inpatient    1955 MRN [id number]   Location [facility] 3NE-A Attending Susan, Guevara Ritchie*   Hosp Day # 1 PCP Shira Cardenas NP     Impression:  1. chronic epigastric pain, mild HS trop elevated 286; abdominal US- 1mm gall stone vs polyp  2. HTN  3. HL  4. generalized anxiety disorder  5. fibromyalgia      Recommendations:  - presentation seems most consistent with GI possible gall stone related etiology, she certainly seems convinced of that. Liver enzymes are normal, mild leukocytosis are non-specific.   - Cardiac wise, would be atypical presentation, but troponin has been mildly elevated chronically in setting of risk factors- age/htn/hl/smoker. Prior stress test non-diagnostic due to inability to reach target HR.  Cardiac work-up to include TTE and vasodilator nuclear stress test today.          History of Present Illness:  Quyen Wolf is a 69 year old female who presented to [facility] on 2024.    Seen in cardiology consultation for mild HS trop elevation. Hx of HTN, generalized anxiety disorder.  Notes months of epigastric pain, both post-prandial and non-food related. Denies tomy chest pain or associated SOB.  No fevers/chills.  RUQ US suggests 1mm gall stone vs polyp. ECG SR nl repolarization, HS trop mildly elevated 286-->228.  Has had mild chronically elevated troponin in the past. Prior ex stress echo  non-diagnostic due to inability to reach target HR.      Medications:  Current Facility-Administered Medications   Medication Dose Route Frequency    albuterol (Ventolin HFA) 108 (90 Base) MCG/ACT inhaler 2 puff  2 puff Inhalation Q6H PRN    amLODIPine (Norvasc) tab 5 mg  5 mg Oral BID    fluticasone-umeclidin-vilant (Trelegy Ellipta) 100-62.5-25 MCG/ACT inhaler 1 puff  1 puff Inhalation Daily    maalox/diphenhydramine/lidocaine (First Mouthwash BLM) oral suspension 10 mL  10 mL Oral Q6H PRN     heparin (Porcine) 5000 UNIT/ML injection 5,000 Units  5,000 Units Subcutaneous Q8H [facility]    enalapril (Vasotec) tab 20 mg  20 mg Oral Daily    ALPRAZolam (Xanax) tab 0.5 mg  0.5 mg Oral TID    sucralfate (Carafate) 1 GM/10ML oral suspension 1 g  1 g Oral TID AC and HS (2200)    famotidine (Pepcid) tab 20 mg  20 mg Oral BID    aspirin chewable tab 324 mg  324 mg Oral Once    acetaminophen (Tylenol Extra Strength) tab 500 mg  500 mg Oral Q4H PRN    ondansetron (Zofran) 4 MG/2ML injection 4 mg  4 mg Intravenous Q6H PRN       Past Medical History:    Anxiety state, unspecified    Fibromyalgia    High blood pressure    Unspecified essential hypertension       History reviewed. No pertinent surgical history.    Family History  family history includes No Known Problems in her father and mother.    Social History   reports that she has been smoking cigarettes. She has a 20 pack-year smoking history. She uses smokeless tobacco. She reports that she does not drink alcohol and does not use drugs.     Allergies  Allergies   Allergen Reactions    Duloxetine Hcl HALLUCINATION    Hydrocodone UNKNOWN    Sulfa Antibiotics UNKNOWN    Sulfur OTHER (SEE COMMENTS) and UNKNOWN    Salicylic Acid-Sulfur NAUSEA ONLY         Review of Systems:  Constitutional: negative for fevers  Eyes: negative for visual disturbance  Ears, nose, mouth, throat, and face: negative for epistaxis  Respiratory: negative for dyspnea on exertion  Cardiovascular: negative for chest pain  Gastrointestinal: negative for melena  Genitourinary:negative for hematuria  Hematologic/lymphatic: negative for bleeding  Musculoskeletal:negative for myalgias  Neurological: negative for dizziness and headaches  Endocrine: negative for temperature intolerance      Physical Exam:  Blood pressure 111/62, pulse 69, temperature 98.7 °F (37.1 °C), temperature source Oral, resp. rate 19, weight 110 lb (49.9 kg), SpO2 94%, not currently breastfeeding.  Temp (24hrs), Av.9 °F (36.6  °C), Min:97.4 °F (36.3 °C), Max:98.7 °F (37.1 °C)    Wt Readings from Last 3 Encounters:   05/27/24 110 lb (49.9 kg)   05/23/24 110 lb (49.9 kg)   12/01/23 103 lb (46.7 kg)       General: Awake and alert; in no acute distress  HEENT: Extraocular movements are intact; sclerae are anicteric; scalp is atrauamatic; no thyromegaly  Neck: Supple; no JVD; no carotid bruits  Cardiac: Regular rate and regular rhythm; no murmurs/rubs/gallops are appreciated; PMI is non-displaced; there is no evidence of a sternal heave  Lungs: Clear to auscultation bilaterally; no accessory muscle use is noted  Abdomen: Soft, non-tender; bowel sounds are normoactive; no hepatosplenomegaly  Extremities: No clubbing or cyanosis; moves all 4 extremities normally  Psychiatric: Normal mood and affect; answers questions appropriately  Dermatologic: No rashes; normal skin turgor    Diagnostic testing:    EKG: Normal sinus rhythm    Labs:   No results found for: \"PT\", \"INR\"  Lab Results   Component Value Date     05/27/2024    HDL 86 05/27/2024    TRIG 83 05/27/2024    VLDL 15 05/27/2024     Lab Results   Component Value Date    WBC 11.2 05/27/2024    HGB 13.9 05/27/2024    HCT 39.2 05/27/2024    .0 05/27/2024    CREATSERUM 0.75 05/27/2024    BUN 13 05/27/2024     05/27/2024    K 3.4 05/27/2024    CL 95 05/27/2024    CO2 22.0 05/27/2024     05/27/2024    CA 9.2 05/27/2024    ALB 3.8 05/27/2024    ALKPHO 92 05/27/2024    BILT 0.7 05/27/2024    TP 6.8 05/27/2024    AST 20 05/27/2024    ALT 30 05/27/2024    LIP 49 05/27/2024         Thank you for allowing our practice to participate in the care of your patient. Please do not hesitate to contact me if you have any questions.    Nate Garg MD  5/28/2024  11:59 AM

## 2024-05-28 NOTE — DIETARY NOTE
[facility]   part of [facility]   CLINICAL NUTRITION    Quyen Wolf     Admitting diagnosis:  Epigastric pain [R10.13]    Ht:  156.2 cm   Wt: 49.9 kg (110 lb).   Body mass index is 20.45 kg/m².  IBW: 48.8kg    Wt Readings from Last 6 Encounters:   05/27/24 49.9 kg (110 lb)   05/23/24 49.9 kg (110 lb)   12/01/23 46.7 kg (103 lb)   11/28/23 54.9 kg (121 lb)   09/12/23 50.9 kg (112 lb 4 oz)   06/06/23 49.9 kg (110 lb)        Labs/Meds reviewed    Diet:       Procedures    Cardiac diet No Caffeine, Treadmill/Cardiac Stress Test     Percent Meals Eaten (last 3 days)       None          69 year old female admitted with abdominal pain, work up by GI and cardiology    Pt chart reviewed d/t at risk nursing consult. RD unable to obtain history from pt or NFPA, pt sleeping and with therapy at time of RD visits.  Pt with wt stable per EMR since December.  Pt assessed by speech and recommend regular diet and thin liquids.    Patient reports fair appetite at this time.    Tolerating po diet without diarrhea, emesis, or constipation.   No significant weight changes noted.     Patient is at low nutrition risk at this time.    Please consult if patient status changes or nutrition issues arise.    Zaina Delvalle RD,LDN  Clinical Dietitian  [phone]

## 2024-05-28 NOTE — OCCUPATIONAL THERAPY NOTE
OCCUPATIONAL THERAPY EVALUATION - INPATIENT    Room Number: 3610/3610-A  Evaluation Date: 5/28/2024     Type of Evaluation: Initial  Presenting Problem: epigastric pain    Physician Order: IP Consult to Occupational Therapy  Reason for Therapy:  ADL/IADL Dysfunction and Discharge Planning      OCCUPATIONAL THERAPY ASSESSMENT   Patient is a 69 year old female admitted on 5/27/2024 with Presenting Problem: epigastric pain. Co-Morbidities : Anxiety, fibromyalgia, HTN  Patient is currently functioning near baseline with self-care and functional mobility.  Prior to admission, patient's baseline is independent.  Patient met all OT goals at mod I to supervision level.  Patient reports no further questions/concerns at this time.     Patient will be discharged from OT services at this time.  Will benefit from home at discharge from hospital.    WEIGHT BEARING RESTRICTION  Weight Bearing Restriction: None                Recommendations for nursing staff:   Transfers: supervision, no device  Toileting location: Toilet    EVALUATION SESSION:  Patient at start of session: supine  FUNCTIONAL TRANSFER ASSESSMENT  Sit to Stand: Edge of Bed; Chair  Edge of Bed: Modified Independent  Chair: Modified Independent  Toilet Transfer: Modified Independent    BED MOBILITY  Rolling: Independent  Supine to Sit : Independent  Sit to Supine (OT): Independent  Scooting: independent    BALANCE ASSESSMENT  Static Sitting: Modified Independent  Sitting Bilateral: Modified Independent  Static Standing: Modified Independent  Standing Bilateral: Modified Independent    FUNCTIONAL ADL ASSESSMENT  Eating: Modified Independent  Grooming Seated: Modified Independent  LB Dressing Seated: Supervision  Toileting Seated: Supervision      ACTIVITY TOLERANCE: WFL                         O2 SATURATIONS       COGNITION  WFL  COGNITION ASSESSMENTS       Upper Extremity:   ROM: within functional limits   Strength: is within functional limits    Coordination:  Gross motor: WFL  Fine motor: WFL  Sensation: no c/o    EDUCATION PROVIDED  Patient: Role of Occupational Therapy; Plan of Care; Discharge Recommendations; Fall Prevention; Functional Transfer Techniques  Patient's Response to Education: Verbalized Understanding    Equipment used: none  Demonstrates functional use    Therapist comments: OT educated patient on safety,  sequencing, energy conservation, pain management, home modifications and adaptive equipment to increase independence with ADLs.      Patient End of Session: In bed;Needs met;Call light within reach;RN aware of session/findings;All patient questions and concerns addressed;Alarm set;Discussed recommendations with /    OCCUPATIONAL PROFILE    HOME SITUATION  Type of Home: House  Home Layout: One level  Lives With: Alone    Toilet and Equipment: Standard height toilet  Shower/Tub and Equipment: Walk-in shower  Other Equipment: None    Occupation/Status: retired  Hand Dominance: Right  Drives: Yes  Patient Regularly Uses: None    Prior Level of Function: independent with ADL/IADLs without use of adaptive device.  Patient reports being very active and does not need help from anyone.  Patient reports no recent falls.    SUBJECTIVE  PAIN ASSESSMENT  Rating: Other (Comment) (100/10)  Location: abdominal area  Management Techniques: Other (Comment) (nurse notified; pt reports no relief with pain meds or positioning)    OBJECTIVE  Precautions: None  Fall Risk: Standard fall risk    WEIGHT BEARING RESTRICTION  Weight Bearing Restriction: None                AM-PAC ‘6-Clicks’ Inpatient Daily Activity Short Form  -   Putting on and taking off regular lower body clothing?: None  -   Bathing (including washing, rinsing, drying)?: None  -   Toileting, which includes using toilet, bedpan or urinal? : None  -   Putting on and taking off regular upper body clothing?: None  -   Taking care of personal grooming such as brushing  teeth?: None  -   Eating meals?: None    AM-PAC Score:  Score: 24  Approx Degree of Impairment: 0%  Standardized Score (AM-PAC Scale): 57.54      ADDITIONAL TESTS     NEUROLOGICAL FINDINGS        PLAN   Patient has been evaluated and presents with no skilled Occupational Therapy needs at this time.  Patient discharged from Occupational Therapy services.  Please re-order if a new functional limitation presents during this admission.      Patient Evaluation Complexity Level:   Occupational Profile/Medical History MODERATE - Expanded review of history including review of medical or therapy record   Specific performance deficits impacting engagement in ADL/IADL LOW  1 - 3 performance deficits    Client Assessment/Performance Deficits LOW - No comorbidities nor modifications of tasks    Clinical Decision Making LOW - Analysis of occupational profile, problem-focused assessments, limited treatment options    Overall Complexity LOW     OT Session Time: 30 minutes  Self-Care Home Management: 5 minutes

## 2024-05-28 NOTE — ED QUICK NOTES
Patient back from US able to ambulate to the bathroom. Declines RN to take any vitals. Patient refuses to change into gown. Patient demanding to eat informed her we are waiting for her US results.

## 2024-05-28 NOTE — ED QUICK NOTES
Orders for admission, patient is aware of plan and ready to go upstairs. Any questions, please call ED WILLOW Roger at extension [phone].     Patient Covid vaccination status: Unvaccinated     COVID Test Ordered in ED: None    COVID Suspicion at Admission: N/A    Running Infusions:  None    Mental Status/LOC at time of transport: a/o x 4    Other pertinent information: patient refusing continuous vitals  CIWA score: N/A   NIH score:  N/A

## 2024-05-29 ENCOUNTER — ANESTHESIA (OUTPATIENT)
Dept: ENDOSCOPY | Facility: HOSPITAL | Age: 69
DRG: 392 | End: 2024-05-29
Payer: MEDICARE

## 2024-05-29 ENCOUNTER — ANESTHESIA EVENT (OUTPATIENT)
Dept: ENDOSCOPY | Facility: HOSPITAL | Age: 69
DRG: 392 | End: 2024-05-29
Payer: MEDICARE

## 2024-05-29 ENCOUNTER — APPOINTMENT (OUTPATIENT)
Dept: INTERNAL MEDICINE | Age: 69
End: 2024-05-29

## 2024-05-29 VITALS
BODY MASS INDEX: 20 KG/M2 | HEART RATE: 80 BPM | TEMPERATURE: 98 F | DIASTOLIC BLOOD PRESSURE: 79 MMHG | WEIGHT: 110 LBS | RESPIRATION RATE: 15 BRPM | OXYGEN SATURATION: 98 % | SYSTOLIC BLOOD PRESSURE: 122 MMHG

## 2024-05-29 LAB — POTASSIUM SERPL-SCNC: 4.1 MMOL/L (ref 3.5–5.1)

## 2024-05-29 PROCEDURE — 88305 TISSUE EXAM BY PATHOLOGIST: CPT | Performed by: INTERNAL MEDICINE

## 2024-05-29 PROCEDURE — 84132 ASSAY OF SERUM POTASSIUM: CPT | Performed by: INTERNAL MEDICINE

## 2024-05-29 PROCEDURE — 0DB68ZX EXCISION OF STOMACH, VIA NATURAL OR ARTIFICIAL OPENING ENDOSCOPIC, DIAGNOSTIC: ICD-10-PCS | Performed by: INTERNAL MEDICINE

## 2024-05-29 RX ORDER — SUCRALFATE ORAL 1 G/10ML
1 SUSPENSION ORAL
Qty: 1200 ML | Refills: 0 | Status: SHIPPED | OUTPATIENT
Start: 2024-05-29

## 2024-05-29 RX ORDER — ONDANSETRON 2 MG/ML
4 INJECTION INTRAMUSCULAR; INTRAVENOUS ONCE AS NEEDED
Status: DISCONTINUED | OUTPATIENT
Start: 2024-05-29 | End: 2024-05-29 | Stop reason: HOSPADM

## 2024-05-29 RX ORDER — SODIUM CHLORIDE, SODIUM LACTATE, POTASSIUM CHLORIDE, CALCIUM CHLORIDE 600; 310; 30; 20 MG/100ML; MG/100ML; MG/100ML; MG/100ML
INJECTION, SOLUTION INTRAVENOUS CONTINUOUS
OUTPATIENT
Start: 2024-05-29

## 2024-05-29 RX ORDER — NALOXONE HYDROCHLORIDE 0.4 MG/ML
0.08 INJECTION, SOLUTION INTRAMUSCULAR; INTRAVENOUS; SUBCUTANEOUS ONCE AS NEEDED
Status: DISCONTINUED | OUTPATIENT
Start: 2024-05-29 | End: 2024-05-29 | Stop reason: HOSPADM

## 2024-05-29 RX ORDER — FAMOTIDINE 20 MG/1
20 TABLET, FILM COATED ORAL 2 TIMES DAILY
Qty: 60 TABLET | Refills: 0 | Status: SHIPPED | OUTPATIENT
Start: 2024-05-29

## 2024-05-29 RX ORDER — LIDOCAINE HYDROCHLORIDE 10 MG/ML
INJECTION, SOLUTION EPIDURAL; INFILTRATION; INTRACAUDAL; PERINEURAL AS NEEDED
Status: DISCONTINUED | OUTPATIENT
Start: 2024-05-29 | End: 2024-05-29 | Stop reason: SURG

## 2024-05-29 RX ORDER — ALPRAZOLAM 0.25 MG/1
0.5 TABLET ORAL ONCE
Status: COMPLETED | OUTPATIENT
Start: 2024-05-29 | End: 2024-05-29

## 2024-05-29 RX ADMIN — LIDOCAINE HYDROCHLORIDE 10 MG: 10 INJECTION, SOLUTION EPIDURAL; INFILTRATION; INTRACAUDAL; PERINEURAL at 12:14:00

## 2024-05-29 NOTE — ANESTHESIA POSTPROCEDURE EVALUATION
[facility]    Quyen Wolf Patient Status:  Inpatient   Age/Gender 69 year old female MRN [id number]   Location [facility] Attending Guevara Davis*   Hosp Day # 2 PCP Shira Cardenas NP       Anesthesia Post-op Note    ESOPHAGOGASTRODUODENOSCOPY (EGD) WITH BIOPSY    Procedure Summary       Date: 05/29/24 Room / Location:  ENDOSCOPY 03 / [facility]    Anesthesia Start: 1209 Anesthesia Stop: 1238    Procedure: ESOPHAGOGASTRODUODENOSCOPY (EGD) WITH BIOPSY Diagnosis: (HIATAL HERNIA,)    Surgeons: Codey Butt MD Anesthesiologist: Carlos Baeza MD    Anesthesia Type: MAC ASA Status: 3            Anesthesia Type: MAC    Vitals Value Taken Time   /70 05/29/24 1250   Temp 98 05/29/24 1252   Pulse 73 05/29/24 1252   Resp 18 05/29/24 1240   SpO2 98 % 05/29/24 1252   Vitals shown include unfiled device data.    Patient Location: Endoscopy    Anesthesia Type: MAC    Airway Patency: patent    Postop Pain Control: adequate    Mental Status: mildly sedated but able to meaningfully participate in the post-anesthesia evaluation    Nausea/Vomiting: none    Cardiopulmonary/Hydration status: stable euvolemic    Complications: no apparent anesthesia related complications    Postop vital signs: stable    Dental Exam: Unchanged from Preop    Patient to be discharged from PACU when criteria met.

## 2024-05-29 NOTE — PROGRESS NOTES
Pt discharged to home with daughter. All discharged information/instructions given to pt and explained to daughter all discharge information.  Pt and daughter verbalized full understanding of all instructions. Pt picked up by family via private car. Left the unit in safe condition.

## 2024-05-29 NOTE — DISCHARGE SUMMARY
[facility] Hospitalist Discharge Summary     Patient ID:  Quyen Wolf  69 year old  5/21/1955    Admit date: 5/27/2024    Discharge date and time: 05/29/24     Attending Physician: Guevara Davis*     Primary Care Physician: Shira Cardenas NP     Discharge Diagnoses: Epigastric pain [R10.13]    Please note that only IHP DMG and EMG patients enrolled in the Medicare ACO, BCBS ACO and [facility] HMOs will be handled by the [facility] Care Management team.  For all other patients, please follow usual protocol for discharge care transition.    Discharge Condition: stable    Disposition:  home    Important Follow up:  - PCP within 2 weeks              Hospital Course:         69 year old female with PMH sig for hypertension, COPD, GERD, presented with epigastric pain.  She thinks she has a gallbladder problem.  Symptoms have been ongoing for many months.  Denies any fevers or chills.  He is reporting pain in the epigastric region.  In the ER vital stable.  Labs significant for hyponatremia, initial troponin of 228, leukocytosis.  X-ray unremarkable of the chest.  Ultrasound of the abdomen with 1 mm stone or possible gallbladder polyp.  Cardiology consulted.  Given full dose heparin.  Admitted for further evaluation and treatment.    Epigastric pain  Hx GERD  - suspect dyspepsia   - PPI BID - refusing prilosec/protonix given prior reactions  - will trial GI cocktail and add carafate   - GI o/c - see endoscopy report below from 5/29 - cont carafate as need as she declined PPI therapy      Elevated troponin  - peak 286  - asa, statin  - cardiology consulted  - echo   - stress test 7/'20 with low probability but target heart rate was not reached, can be considered indeterminate study      Severe anxiety  - resume xanax TID (home dosing)      1mm GB polyp  - no RUQ pain   - OP monitoring     Essential HTN  - amlodipine, vasotec     COPD  - trelegy, prn  albuterol      Consults: IP CONSULT TO HOSPITALIST  IP CONSULT TO CARDIOLOGY  NURSING CONSULT TO DIETITIAN  IP CONSULT TO GASTROENTEROLOGY    Operative Procedures: Procedure(s) (LRB):  ESOPHAGOGASTRODUODENOSCOPY (EGD) WITH BIOPSY (N/A)     ENDOSCOPY OPERATIVE REPORT     Patient Name:                  Quyen Wolf  Medical Record #:           [id number]  YOB: 1955  Date of Procedure:          5/29/2024     Preoperative Diagnosis:  epigastric pain     Postoperative Diagnosis:  la grade A esophagitis. Sliding 1-2 cm hiatal hernia.  No visible ulcer or gastritis although some staining of gastric mucosa from medication     Procedure Performed:Esophagogastroduodenoscopy with biopsy                  Anesthesia Given: MAC              Endoscopist:                   Codey Butt MD           Procedure:   After the patient was interviewed and the procedure again discussed and questions addressed, the patient was brought to the GI Lab and monitoring of the B/P, pulse, and pulse oximetry was performed. The patient was then placed in the left lateral decubitus position and sedated with divided doses of IV medication; continuous vital signs were monitored throughout the procedure.     A time-out procedure was performed, history and physical were reviewed, medical reconciliation was complete, informed consent was obtained.      The videogastroscope was intubated into the esophagus and advanced into the duodenum under directed vision without difficulty. Then withdrawn. A thorough exam was performed.     The patient tolerated the procedure well.         Findings:     The esophagus mucosa had an overall normal appearance, at GEJ there was LA grade A esophagitis and sliding 1-2 cm hiatal hernia. The Z-line occurred  at the top of the gastric folds.      The gastric lumen was then entered and views of the gastric mucosa as well as retroverted views of the fundus were unremarakble, no visible ulcer  or gastritis although some staining of gastric mucosa from medication, gastric tissue washed, no ulcer or visible gastritis.   Biopsies from the prepyloric area, antrum, body, and fundus were taken for histology and for H. Pylori.     A normal pylorus was passed and the duodenal bulb entered, the duodenal bulb and post-bulbar duodenum were unremarkable.       The procedure was tolerated well and upon completion and throughtout the vital signs were stable.        Specimens:  See above        Condition on discharge from procedure:  good        PLAN:           --no clear ulcer or gastritis on this exam, some staining of mucosa from the carafate though.  If H.pylori is present, I would recommend treatment  --mild esophagitis present, she did not want PPI  --okay to continue carafate as needed and attempt diet. She can do malox and viscous lidocain prn  --anxiety may be playing a role, defer to hospitalist for this     --if symptoms persist, can check gastric empyting scan or barium sbft or other eval if needed. Discussed findings and options and risk of other etio's in detail w/ daughter (tee -- by phone) and w/ pt before and after egd     Okay to attempt adv diet and outpt prescription for carafate if she is willing    Patient instructions:      I as the attending physician reconciled the current and discharge medications on day of discharge.     Current Discharge Medication List        START taking these medications    Details   famotidine 20 MG Oral Tab Take 1 tablet (20 mg total) by mouth 2 (two) times daily.      sucralfate 1 GM/10ML Oral Suspension Take 10 mL (1 g total) by mouth 4 (four) times daily before meals and nightly (2200).           CONTINUE these medications which have NOT CHANGED    Details   docusate sodium 100 MG Oral Cap Take 1 capsule (100 mg total) by mouth 2 (two) times daily.      ALPRAZolam 0.5 MG Oral Tab Take 1 tablet (0.5 mg total) by mouth 6 (six) times daily.      TRELEGY ELLIPTA  100-62.5-25 MCG/ACT Inhalation Aerosol Powder, Breath Activated Inhale 1 puff into the lungs daily.      amLODIPine 5 MG Oral Tab Take 1 tablet (5 mg total) by mouth 2 (two) times a day.      enalapril 20 MG Oral Tab Take 1 tablet (20 mg total) by mouth 2 (two) times daily.      albuterol 108 (90 Base) MCG/ACT Inhalation Aero Soln Inhale 2 puffs into the lungs every 4 to 6 hours as needed for Wheezing.           STOP taking these medications       Omeprazole 40 MG Oral Capsule Delayed Release              Activity: activity as tolerated  Diet: regular diet  Wound Care: as directed  Code Status: No Order      Discharge Exam:     General: no acute distress, alert and oriented x 3  Heart: RRR  Lungs: clear bilaterally, no active wheezing  Abdomen: nontender, nondistended, intact BS  Extremities: no pedal edema   Neuro: CN inact, no focal deficits      Total time coordinating care for discharge: Greater than 30 minutes    rEma Davis MD  [facility] Room[facility]

## 2024-05-29 NOTE — DISCHARGE SUMMARY
[facility] Hospitalist Discharge Summary     Patient ID:  Quyen Wolf  69 year old  5/21/1955    Admit date: 5/27/2024    Discharge date and time: 05/29/24     Attending Physician: Guevara Davis*     Primary Care Physician: Shira Cardenas NP     Discharge Diagnoses: Epigastric pain [R10.13]    Please note that only IHP DMG and EMG patients enrolled in the Medicare ACO, BCBS ACO and [facility] HMOs will be handled by the [facility] Care Management team.  For all other patients, please follow usual protocol for discharge care transition.    Discharge Condition: stable    Disposition:  home    Important Follow up:  - PCP within 2 weeks              Hospital Course:         69 year old female with PMH sig for hypertension, COPD, GERD, presented with epigastric pain.  She thinks she has a gallbladder problem.  Symptoms have been ongoing for many months.  Denies any fevers or chills.  He is reporting pain in the epigastric region.  In the ER vital stable.  Labs significant for hyponatremia, initial troponin of 228, leukocytosis.  X-ray unremarkable of the chest.  Ultrasound of the abdomen with 1 mm stone or possible gallbladder polyp.  Cardiology consulted.  Given full dose heparin.  Admitted for further evaluation and treatment.    Epigastric pain  Hx GERD  - suspect dyspepsia   - PPI BID - refusing prilosec/protonix given prior reactions  - will trial GI cocktail and add carafate   - consult GI given history of ulcer  - Endoscopy results***     Elevated troponin  - peak 286  - asa, statin  - cardiology consulted  - echo   - stress test 7/'20 with low probability but target heart rate was not reached, can be considered indeterminate study      Severe anxiety  - resume xanax TID (home dosing)      1mm GB polyp  - no RUQ pain   - OP monitoring     Essential HTN  - amlodipine, vasotec     COPD  - trelegy, prn albuterol      Consults: IP CONSULT TO HOSPITALIST  IP  CONSULT TO CARDIOLOGY  NURSING CONSULT TO DIETITIAN  IP CONSULT TO GASTROENTEROLOGY    Operative Procedures: Procedure(s) (LRB):  ESOPHAGOGASTRODUODENOSCOPY (EGD) (N/A)       Patient instructions:      I as the attending physician reconciled the current and discharge medications on day of discharge.     Current Discharge Medication List        CONTINUE these medications which have NOT CHANGED    Details   docusate sodium 100 MG Oral Cap Take 1 capsule (100 mg total) by mouth 2 (two) times daily.      ALPRAZolam 0.5 MG Oral Tab Take 1 tablet (0.5 mg total) by mouth 6 (six) times daily.      Omeprazole 40 MG Oral Capsule Delayed Release Take 1 capsule (40 mg total) by mouth daily.      TRELEGY ELLIPTA 100-62.5-25 MCG/ACT Inhalation Aerosol Powder, Breath Activated Inhale 1 puff into the lungs daily.      amLODIPine 5 MG Oral Tab Take 1 tablet (5 mg total) by mouth 2 (two) times a day.      enalapril 20 MG Oral Tab Take 1 tablet (20 mg total) by mouth 2 (two) times daily.      albuterol 108 (90 Base) MCG/ACT Inhalation Aero Soln Inhale 2 puffs into the lungs every 4 to 6 hours as needed for Wheezing.             Activity: {discharge activity:86131}  Diet: {diet:82952}  Wound Care: as directed  Code Status: No Order      Discharge Exam:     General: no acute distress, alert and oriented x 3  Heart: RRR  Lungs: clear bilaterally, no active wheezing  Abdomen: nontender, nondistended, intact BS  Extremities: no pedal edema   Neuro: CN inact, no focal deficits      Total time coordinating care for discharge: Greater than 30 minutes    Erma Davis MD  [facility] Hospitalist

## 2024-05-29 NOTE — ADDENDUM NOTE
Addendum  created 05/29/24 1312 by Carlos Baeza MD    Intraprocedure Event edited, Intraprocedure Staff edited

## 2024-05-29 NOTE — PLAN OF CARE
Assumed care of pt around 1930. Pt alert and oriented x3, extremely anxious. Complained of head pain post MRI, scheduled medication given with relief. Denies any shortness of breath. Lung sounds diminished bilaterally, NSR on tele, patient refusing continuous tele. Abdomen soft, non tender , LBM 5/27. Bed locked and in lowest position, call light within reach.  POC: pain management, request medical records from previous hospital, possible EGD. Pt updated, all questions answered, verbalized understanding.    0119: On call hospitalist paged for patient requesting additional Xanax dose, see new orders.    Problem: Patient/Family Goals  Goal: Patient/Family Long Term Goal  Description: Patient's Long Term Goal: stay out of the hospital    Interventions:  - take all medication as prescribed  - attend all follow up appointments  - See additional Care Plan goals for specific interventions  5/29/2024 0313 by Madison Park, RN  Outcome: Progressing  5/29/2024 0312 by Madison Park, RN  Outcome: Progressing  Goal: Patient/Family Short Term Goal  Description: Patient's Short Term Goal: go home    Interventions:   - take all medications as prescribed  - all testing as ordered by providers  - See additional Care Plan goals for specific interventions  5/29/2024 0313 by Madison Park, RN  Outcome: Progressing  5/29/2024 0312 by Madison Park, RN  Outcome: Progressing     Problem: GASTROINTESTINAL - ADULT  Goal: Minimal or absence of nausea and vomiting  Description: INTERVENTIONS:  - Maintain adequate hydration with IV or PO as ordered and tolerated  - Nasogastric tube to low intermittent suction as ordered  - Evaluate effectiveness of ordered antiemetic medications  - Provide nonpharmacologic comfort measures as appropriate  - Advance diet as tolerated, if ordered  - Obtain nutritional consult as needed  - Evaluate fluid balance  Outcome: Progressing  Goal: Maintains or returns to baseline bowel function  Description:  INTERVENTIONS:  - Assess bowel function  - Maintain adequate hydration with IV or PO as ordered and tolerated  - Evaluate effectiveness of GI medications  - Encourage mobilization and activity  - Obtain nutritional consult as needed  - Establish a toileting routine/schedule  - Consider collaborating with pharmacy to review patient's medication profile  Outcome: Progressing  Goal: Maintains adequate nutritional intake (undernourished)  Description: INTERVENTIONS:  - Monitor percentage of each meal consumed  - Identify factors contributing to decreased intake, treat as appropriate  - Assist with meals as needed  - Monitor I&O, WT and lab values  - Obtain nutritional consult as needed  - Optimize oral hygiene and moisture  - Encourage food from home; allow for food preferences  - Enhance eating environment  Outcome: Progressing  Goal: Achieves appropriate nutritional intake (bariatric)  Description: INTERVENTIONS:  - Monitor for over-consumption  - Identify factors contributing to increased intake, treat as appropriate  - Monitor I&O, WT and lab values  - Obtain nutritional consult as needed  - Evaluate psychosocial factors contributing to over-consumption  Outcome: Progressing     Problem: Impaired Cognition  Goal: Patient will exhibit improved attention, thought processing and/or memory  Description: Interventions:  Outcome: Progressing

## 2024-05-29 NOTE — PROGRESS NOTES
[facility]  Progress Note    Quyen Wolf Patient Status:  Inpatient    1955 MRN [id number]   Location [facility] 3NE-A Attending Susan, Guevara Ritchie*   Hosp Day # 2 PCP Shira Cardenas NP     Subjective:  Quyen Wolf is a(n) 69 year old female.        Current complaints: anxiety over night per RN    Reji helped w/ some symptoms    Denies new issues    Has anxiety, wants eval for pud    Pt agreeable to me trying to call daughter after egd    I discussed her care w/ Dr Davis as well      Current Facility-Administered Medications   Medication Dose Route Frequency    albuterol (Ventolin HFA) 108 (90 Base) MCG/ACT inhaler 2 puff  2 puff Inhalation Q6H PRN    amLODIPine (Norvasc) tab 5 mg  5 mg Oral BID    fluticasone-umeclidin-vilant (Trelegy Ellipta) 100-62.5-25 MCG/ACT inhaler 1 puff  1 puff Inhalation Daily    maalox/diphenhydramine/lidocaine (First Mouthwash BLM) oral suspension 10 mL  10 mL Oral Q6H PRN    heparin (Porcine) 5000 UNIT/ML injection 5,000 Units  5,000 Units Subcutaneous Q8H ANDREA    enalapril (Vasotec) tab 20 mg  20 mg Oral Daily    ALPRAZolam (Xanax) tab 0.5 mg  0.5 mg Oral TID    sucralfate (Carafate) 1 GM/10ML oral suspension 1 g  1 g Oral TID AC and HS (2200)    famotidine (Pepcid) tab 20 mg  20 mg Oral BID    nicotine polacrilex (Nicorette) gum 2 mg  2 mg Oral Q1H PRN    nicotine (Nicoderm CQ) 21 MG/24HR patch 1 patch  1 patch Transdermal Daily    aspirin chewable tab 324 mg  324 mg Oral Once    acetaminophen (Tylenol Extra Strength) tab 500 mg  500 mg Oral Q4H PRN    ondansetron (Zofran) 4 MG/2ML injection 4 mg  4 mg Intravenous Q6H PRN        Objective:    /72 (BP Location: Right arm)   Pulse 63   Temp 97.9 °F (36.6 °C) (Oral)   Resp 18   Wt 110 lb (49.9 kg)   SpO2 98%   BMI 20.45 kg/m²   General appearance: alert, appears stated age, and cooperative  Lungs: clear to auscultation bilaterally  Heart: reg rate   Abdomen: soft, non-tender; bowel sounds normal;  no masses,  no organomegaly   Extremities: no le edema  Neurologic: Grossly normal          Labs:     Recent Labs   Lab 05/23/24  1205 05/27/24  1644   RBC 4.41 4.51   HGB 13.6 13.9   HCT 39.1 39.2   MCV 88.7 86.9   MCH 30.8 30.8   MCHC 34.8 35.5   RDW 14.2 14.0   NEPRELIM 9.42* 8.36*   WBC 11.8* 11.2*   .0 323.0       Lab Results   Component Value Date    K 4.1 05/29/2024             )         Narrative   PROCEDURE:  MRI ABDOMEN&MRCP W/3D (W+W0)(CPT=74183/23269)     COMPARISON:  [facility] , US, US ABDOMEN COMPLETE (CPT=76700), 5/27/2024, 7:04 PM.  [facility] , CT, CT ABDOMEN+PELVIS(CONTRAST ONLY)(CPT=74177), 5/23/2024, 3:14 PM.     INDICATIONS:  possible gallstone vs polyp.  epigastric pain, eval cbd     TECHNIQUE:  Multiplanar single shot fast spin echo, chemical shift imaging, breath hold T2 weighted images and 2-D fiesta sequences were acquired. Fluid sensitive imaging with MRCP reconstruction was also performed including 3D multi-projection imaging  (non independent workstation).  Both projection and source MRCP images are evaluated.  Subsequent post contrast imaging was performed after intravenous administration of paramagnetic contrast agent.     3-D RENDERING:  Additional 3-D rendering was generated by the technologist.     PATIENT STATED HISTORY:(As transcribed by Technologist)  Patient is being evaluated for possible gallstone, complains of epigastic pain and burning sensation      CONTRAST USED:  10mL of Dotarem     FINDINGS:    LIVER:  No enlargement, atrophy, abnormal density, or significant focal lesion.    BILIARY:  Gallbladder and bile ducts are unremarkable within the limits of MRI.  1 mm echogenic focus on ultrasound would not be identified on MRI.  There is no bile duct distension.  PANCREAS:  No lesion, fluid collection, ductal dilatation, or atrophy.    SPLEEN:  No enlargement or focal lesion.    KIDNEYS:  No mass or obstruction.    ADRENALS:  No mass or enlargement.    AORTA/VASCULAR:   Infrarenal abdominal aortic aneurysm has been seen to better advantage on recent CT.  RETROPERITONEUM:  No mass or adenopathy.    BOWEL/MESENTERY:  No visible mass, obstruction, or bowel wall thickening.    ABDOMINAL WALL:  No mass or hernia.    BONES:  No bony lesion or fracture.    LUNG BASES:  No visible pleural disease.  Lung bases not well assessed with MRI.    OTHER:  Negative.                     Impression   CONCLUSION:    1. Specific etiology for abdominal pain is not evident from this study.  2. Gallbladder and bile ducts are unremarkable.  1 mm echogenic focus noted on ultrasound would not be visualized on MRI.  3. 3.2 cm abdominal aortic aneurysm has been recently described and is unchanged.       LOCATION:  [place]           Dictated by (CST): Erik Gil MD on 5/28/2024 at 7:36 PM                   Assessment and Plan:  Patient Active Problem List   Diagnosis    Generalized anxiety disorder with panic attacks    Essential hypertension    Bilateral leg pain    Chronic migraine without aura without status migrainosus, not intractable    Aortic atherosclerosis (HCC)    DDD (degenerative disc disease), lumbar    Spinal stenosis of lumbar region with neurogenic claudication    Lumbar pain    Chronic left-sided low back pain with sciatica    Decreased range of motion of lumbar spine    Age-related osteoporosis without current pathological fracture    Diffuse brain atrophy (HCC)    Tobacco use    COPD (chronic obstructive pulmonary disease) (HCC)    Opioid dependence with current use (HCC)    Mitral valve regurgitation    Screening, lipid    Anxiety    Benzodiazepine dependence (HCC)    Epigastric pain    Elevated troponin         1 epigastric pain  -- no source on mri. Carafate w/ some help  --egd now  --await review of old record  --anxiet per hospitalist    --patient demonstrated understanding of the results and recommendations and risks, benefits, limitations, and alternatives to the plan. All of their  questions and concerns were addressed and were agreeable to the plan as listed      Codey Butt MD

## 2024-05-29 NOTE — PROGRESS NOTES
[facility] Cardiology  Consultation Note      Quyen Wolf Patient Status:  Inpatient    1955 MRN [id number]   Location [facility] 3NE-A Attending Susan, Guevara Ritchie*   Hosp Day # 2 PCP Shira Cardenas NP     Impression:  1. chronic epigastric pain, mild HS trop elevated 286; abdominal US- 1mm gall stone vs polyp  - nuclear stress (24, per verbal report): nl perfusion. LVEF 38% (may be ectopy-induced gating issue)--> TTE LVEF 60-65%  2. HTN  3. HL  4. generalized anxiety disorder  5. fibromyalgia      Recommendations:  - mild/chronic troponin elevation without concerning findings on nuclear perfusion imaging. LVEF reported 38%, but may be ectopy-induced gating artifact.  TTE LVEF 60-65%.  No further cardiac work-up indicated.  - hx PUD/chronic epigastric pain: EGD today.     S: no acute issues overnight.      History of Present Illness:  Quyen Wolf is a 69 year old female who presented to [facility] on 2024.    Seen in cardiology consultation for mild HS trop elevation. Hx of HTN, generalized anxiety disorder.  Notes months of epigastric pain, both post-prandial and non-food related. Denies tomy chest pain or associated SOB.  No fevers/chills.  RUQ US suggests 1mm gall stone vs polyp. ECG SR nl repolarization, HS trop mildly elevated 286-->228.  Has had mild chronically elevated troponin in the past. Prior ex stress echo  non-diagnostic due to inability to reach target HR.      Medications:  Current Facility-Administered Medications   Medication Dose Route Frequency    albuterol (Ventolin HFA) 108 (90 Base) MCG/ACT inhaler 2 puff  2 puff Inhalation Q6H PRN    amLODIPine (Norvasc) tab 5 mg  5 mg Oral BID    fluticasone-umeclidin-vilant (Trelegy Ellipta) 100-62.5-25 MCG/ACT inhaler 1 puff  1 puff Inhalation Daily    maalox/diphenhydramine/lidocaine (First Mouthwash BLM) oral suspension 10 mL  10 mL Oral Q6H PRN    heparin (Porcine) 5000 UNIT/ML injection 5,000 Units   5,000 Units Subcutaneous Q8H ANDREA    enalapril (Vasotec) tab 20 mg  20 mg Oral Daily    ALPRAZolam (Xanax) tab 0.5 mg  0.5 mg Oral TID    sucralfate (Carafate) 1 GM/10ML oral suspension 1 g  1 g Oral TID AC and HS (2200)    famotidine (Pepcid) tab 20 mg  20 mg Oral BID    nicotine polacrilex (Nicorette) gum 2 mg  2 mg Oral Q1H PRN    nicotine (Nicoderm CQ) 21 MG/24HR patch 1 patch  1 patch Transdermal Daily    aspirin chewable tab 324 mg  324 mg Oral Once    acetaminophen (Tylenol Extra Strength) tab 500 mg  500 mg Oral Q4H PRN    ondansetron (Zofran) 4 MG/2ML injection 4 mg  4 mg Intravenous Q6H PRN       Past Medical History:    Anxiety state, unspecified    Fibromyalgia    High blood pressure    Unspecified essential hypertension       History reviewed. No pertinent surgical history.    Family History  family history includes No Known Problems in her father and mother.    Social History   reports that she has been smoking cigarettes. She has a 20 pack-year smoking history. She uses smokeless tobacco. She reports that she does not drink alcohol and does not use drugs.     Allergies  Allergies   Allergen Reactions    Duloxetine Hcl HALLUCINATION    Hydrocodone UNKNOWN    Sulfa Antibiotics UNKNOWN    Sulfur OTHER (SEE COMMENTS) and UNKNOWN    Salicylic Acid-Sulfur NAUSEA ONLY         Review of Systems:  Constitutional: negative for fevers  Eyes: negative for visual disturbance  Ears, nose, mouth, throat, and face: negative for epistaxis  Respiratory: negative for dyspnea on exertion  Cardiovascular: negative for chest pain  Gastrointestinal: negative for melena  Genitourinary:negative for hematuria  Hematologic/lymphatic: negative for bleeding  Musculoskeletal:negative for myalgias  Neurological: negative for dizziness and headaches  Endocrine: negative for temperature intolerance      Physical Exam:  Blood pressure 157/87, pulse 84, temperature 98 °F (36.7 °C), temperature source Oral, resp. rate 16, weight 110 lb  (49.9 kg), SpO2 98%, not currently breastfeeding.  Temp (24hrs), Av.2 °F (36.8 °C), Min:97.9 °F (36.6 °C), Max:98.5 °F (36.9 °C)    Wt Readings from Last 3 Encounters:   24 110 lb (49.9 kg)   24 110 lb (49.9 kg)   23 103 lb (46.7 kg)       General: Awake and alert; in no acute distress  HEENT: Extraocular movements are intact; sclerae are anicteric; scalp is atrauamatic; no thyromegaly  Neck: Supple; no JVD; no carotid bruits  Cardiac: Regular rate and regular rhythm; no murmurs/rubs/gallops are appreciated; PMI is non-displaced; there is no evidence of a sternal heave  Lungs: Clear to auscultation bilaterally; no accessory muscle use is noted  Abdomen: Soft, non-tender; bowel sounds are normoactive; no hepatosplenomegaly  Extremities: No clubbing or cyanosis; moves all 4 extremities normally  Psychiatric: Normal mood and affect; answers questions appropriately  Dermatologic: No rashes; normal skin turgor    Diagnostic testing:    EKG: Normal sinus rhythm    Labs:   No results found for: \"PT\", \"INR\"       Lab Results   Component Value Date    K 4.1 2024         Thank you for allowing our practice to participate in the care of your patient. Please do not hesitate to contact me if you have any questions.

## 2024-05-29 NOTE — OPERATIVE REPORT
ENDOSCOPY OPERATIVE REPORT    Patient Name:  Quyen Wolf  Medical Record #: [id number]  YOB: 1955  Date of Procedure: 5/29/2024    Preoperative Diagnosis:  epigastric pain    Postoperative Diagnosis:  la grade A esophagitis. Sliding 1-2 cm hiatal hernia.  No visible ulcer or gastritis although some staining of gastric mucosa from medication    Procedure Performed: Esophagogastroduodenoscopy with biopsy                 Anesthesia Given: MAC          Endoscopist:   Codey Butt MD        Procedure:   After the patient was interviewed and the procedure again discussed and questions addressed, the patient was brought to the GI Lab and monitoring of the B/P, pulse, and pulse oximetry was performed. The patient was then placed in the left lateral decubitus position and sedated with divided doses of IV medication; continuous vital signs were monitored throughout the procedure.    A time-out procedure was performed, history and physical were reviewed, medical reconciliation was complete, informed consent was obtained.     The videogastroscope was intubated into the esophagus and advanced into the duodenum under directed vision without difficulty. Then withdrawn. A thorough exam was performed.    The patient tolerated the procedure well.       Findings:    The esophagus mucosa had an overall normal appearance, at GEJ there was LA grade A esophagitis and sliding 1-2 cm hiatal hernia. The Z-line occurred  at the top of the gastric folds.     The gastric lumen was then entered and views of the gastric mucosa as well as retroverted views of the fundus were unremarakble, no visible ulcer or gastritis although some staining of gastric mucosa from medication, gastric tissue washed, no ulcer or visible gastritis.   Biopsies from the prepyloric area, antrum, body, and fundus were taken for histology and for H. Pylori.    A normal pylorus was passed and the duodenal bulb entered, the duodenal bulb and post-bulbar  duodenum were unremarkable.      The procedure was tolerated well and upon completion and throughtout the vital signs were stable.      Specimens:  See above      Condition on discharge from procedure:  good      PLAN:        --no clear ulcer or gastritis on this exam, some staining of mucosa from the carafate though.  If H.pylori is present, I would recommend treatment  --mild esophagitis present, she did not want PPI  --okay to continue carafate as needed and attempt diet. She can do malox and viscous lidocain prn  --anxiety may be playing a role, defer to hospitalist for this    --if symptoms persist, can check gastric empyting scan or barium sbft or other eval if needed. Discussed findings and options and risk of other etio's in detail w/ daughter (tee -- by phone) and w/ pt before and after egd    Okay to attempt adv diet and outpt prescription for carafate if she is willing      Reviewed w/ Dr Davis as well    Codey Butt MD  [facility] [place] [place] [facility]

## 2024-05-29 NOTE — ANESTHESIA PREPROCEDURE EVALUATION
PRE-OP EVALUATION    Patient Name: Quyen Wolf    Admit Diagnosis: Epigastric pain [R10.13]    Pre-op Diagnosis: INPATIENT    ESOPHAGOGASTRODUODENOSCOPY (EGD)    Anesthesia Procedure: ESOPHAGOGASTRODUODENOSCOPY (EGD)    Surgeons and Role:     * Codey Butt MD - Primary    Pre-op vitals reviewed.  Temp: 98 °F (36.7 °C)  Pulse: 84  Resp: 16  BP: 157/87  SpO2: 98 %  Body mass index is 20.45 kg/m².    Current medications reviewed.  Hospital Medications:   [COMPLETED] ALPRAZolam (Xanax) tab 0.5 mg  0.5 mg Oral Once    [COMPLETED] potassium chloride (Klor-Con M20) tab 40 mEq  40 mEq Oral Once    albuterol (Ventolin HFA) 108 (90 Base) MCG/ACT inhaler 2 puff  2 puff Inhalation Q6H PRN    amLODIPine (Norvasc) tab 5 mg  5 mg Oral BID    fluticasone-umeclidin-vilant (Trelegy Ellipta) 100-62.5-25 MCG/ACT inhaler 1 puff  1 puff Inhalation Daily    maalox/diphenhydramine/lidocaine (First Mouthwash BLM) oral suspension 10 mL  10 mL Oral Q6H PRN    heparin (Porcine) 5000 UNIT/ML injection 5,000 Units  5,000 Units Subcutaneous Q8H ANDREA    enalapril (Vasotec) tab 20 mg  20 mg Oral Daily    ALPRAZolam (Xanax) tab 0.5 mg  0.5 mg Oral TID    sucralfate (Carafate) 1 GM/10ML oral suspension 1 g  1 g Oral TID AC and HS (2200)    famotidine (Pepcid) tab 20 mg  20 mg Oral BID    nicotine polacrilex (Nicorette) gum 2 mg  2 mg Oral Q1H PRN    nicotine (Nicoderm CQ) 21 MG/24HR patch 1 patch  1 patch Transdermal Daily    [COMPLETED] regadenoson (Lexiscan) 0.4 mg/5mL injection        [COMPLETED] aminophylline 25 mg/mL injection        [COMPLETED] LORazepam (Ativan) 2 mg/mL injection 0.5 mg  0.5 mg Intravenous Once    [COMPLETED] gadoterate meglumine (Dotarem) 5 MMOL/10ML injection 10 mL  10 mL Intravenous ONCE PRN    [COMPLETED] ondansetron (Zofran) 4 MG/2ML injection        aspirin chewable tab 324 mg  324 mg Oral Once    acetaminophen (Tylenol Extra Strength) tab 500 mg  500 mg Oral Q4H PRN    ondansetron (Zofran) 4 MG/2ML injection 4 mg   4 mg Intravenous Q6H PRN       Outpatient Medications:     Medications Prior to Admission   Medication Sig Dispense Refill Last Dose    docusate sodium 100 MG Oral Cap Take 1 capsule (100 mg total) by mouth 2 (two) times daily. 60 capsule 0 Past Month    ALPRAZolam 0.5 MG Oral Tab Take 1 tablet (0.5 mg total) by mouth 6 (six) times daily. 42 tablet 3 5/27/2024    Omeprazole 40 MG Oral Capsule Delayed Release Take 1 capsule (40 mg total) by mouth daily. 30 capsule 2 5/27/2024    TRELEGY ELLIPTA 100-62.5-25 MCG/ACT Inhalation Aerosol Powder, Breath Activated Inhale 1 puff into the lungs daily.   5/27/2024    amLODIPine 5 MG Oral Tab Take 1 tablet (5 mg total) by mouth 2 (two) times a day. 180 tablet 1 5/27/2024    enalapril 20 MG Oral Tab Take 1 tablet (20 mg total) by mouth 2 (two) times daily. 180 tablet 1 5/27/2024    albuterol 108 (90 Base) MCG/ACT Inhalation Aero Soln Inhale 2 puffs into the lungs every 4 to 6 hours as needed for Wheezing. 18 g 3 5/27/2024       Allergies: Duloxetine hcl, Hydrocodone, Sulfa antibiotics, Sulfur, and Salicylic acid-sulfur      Anesthesia Evaluation        Anesthetic Complications           GI/Hepatic/Renal    Negative GI/hepatic/renal ROS.                             Cardiovascular                  (+) hypertension             (+) valvular problems/murmurs and MR                       Endo/Other    Negative endo/other ROS.                              Pulmonary        (+) COPD and mild  COPD not requiring home oxygen.                Neuro/Psych    Negative neuro/psych ROS.                                  History reviewed. No pertinent surgical history.  Social History     Socioeconomic History    Marital status: Single   Tobacco Use    Smoking status: Every Day     Current packs/day: 0.50     Average packs/day: 0.5 packs/day for 40.0 years (20.0 ttl pk-yrs)     Types: Cigarettes    Smokeless tobacco: Current   Vaping Use    Vaping status: Never Used   Substance and Sexual Activity     Alcohol use: No    Drug use: No     History   Drug Use No     Available pre-op labs reviewed.  Lab Results   Component Value Date    WBC 11.2 (H) 05/27/2024    RBC 4.51 05/27/2024    HGB 13.9 05/27/2024    HCT 39.2 05/27/2024    MCV 86.9 05/27/2024    MCH 30.8 05/27/2024    MCHC 35.5 05/27/2024    RDW 14.0 05/27/2024    .0 05/27/2024     Lab Results   Component Value Date     (L) 05/27/2024    K 4.1 05/29/2024    CL 95 (L) 05/27/2024    CO2 22.0 05/27/2024    BUN 13 05/27/2024    CREATSERUM 0.75 05/27/2024     (H) 05/27/2024    CA 9.2 05/27/2024             ASA: 3   Plan: MAC  NPO status verified and patient meets guidelines.    Post-procedure pain management plan discussed with surgeon and patient.      Plan/risks discussed with: patient and Other                Present on Admission:  **None**

## 2024-05-29 NOTE — PLAN OF CARE
Assume care @ 5862  AO X3, forgerful. Verbally responsive, RA  Pt very anxious. Scheduled xanax administered. Lots of redirection done with pt. Not understanding why she NPO. Explained pt she needs to be NPO for procedure. Refusing tele on.  Frequently coming out often room yelling for xanax even after administering the scheduled dos  C/o of pain to ABD. Tylenol administered. On pepcid andsulcrate  Continent. Up ad christopher  EGD done  Good appetite meals. Fall and safety precautions in place    - Request form faxed over to [facility] for pt old records.

## 2024-05-30 ENCOUNTER — TELEPHONE (OUTPATIENT)
Dept: FAMILY MEDICINE | Age: 69
End: 2024-05-30

## 2024-05-30 NOTE — CDS QUERY
Clinical Significance-Sodium  CLINICAL DOCUMENTATION CLARIFICATION FORM  Dear Dr. Davis,  Clinical information (provided below) includes abnormal sodium level on lab results . For accurate ICD-10-CM code assignment,  PLEASE CHECK THE DIAGNOSIS THAT APPLIES .  SELECTION BY PROVIDER ONLY  [  ]  Hyponatremia   [  x] Abnormal sodium without clinical significance   [  ]  Other (please specify): __________________________________________________________    Documentation from the medical record    69 F to ED with complains of abdominal pain x 6 months.    Possible Risk Factors: abdominal pain with possible poor PO intake; enalapril    Clinical Indicators: 5/23/24  Na 128 Glucose 137 corrected Na level 129                                     5/27/24 Na 127 Glucose 110 Corrected Na level 127    Treatment: daily labs    For questions regarding this query, please contact: Clinical  Marisel Galloway RN, BSN Ext:[phone] Thank you.    THIS FORM IS A PERMANENT PART OF THE MEDICAL RECORD

## 2025-02-05 ENCOUNTER — OFFICE VISIT (OUTPATIENT)
Dept: INTERNAL MEDICINE CLINIC | Facility: CLINIC | Age: 70
End: 2025-02-05
Payer: MEDICARE

## 2025-02-05 VITALS
WEIGHT: 118 LBS | BODY MASS INDEX: 22 KG/M2 | DIASTOLIC BLOOD PRESSURE: 76 MMHG | RESPIRATION RATE: 16 BRPM | OXYGEN SATURATION: 97 % | SYSTOLIC BLOOD PRESSURE: 124 MMHG | HEART RATE: 76 BPM

## 2025-02-05 DIAGNOSIS — R73.03 PREDIABETES: ICD-10-CM

## 2025-02-05 DIAGNOSIS — F03.A4 MILD DEMENTIA WITH ANXIETY, UNSPECIFIED DEMENTIA TYPE (HCC): ICD-10-CM

## 2025-02-05 DIAGNOSIS — F41.9 ANXIETY: ICD-10-CM

## 2025-02-05 DIAGNOSIS — Z00.00 ROUTINE PHYSICAL EXAMINATION: Primary | ICD-10-CM

## 2025-02-05 DIAGNOSIS — J44.9 CHRONIC OBSTRUCTIVE PULMONARY DISEASE, UNSPECIFIED COPD TYPE (HCC): ICD-10-CM

## 2025-02-05 DIAGNOSIS — Z59.41 FOOD INSECURITY: ICD-10-CM

## 2025-02-05 DIAGNOSIS — Z12.31 ENCOUNTER FOR SCREENING MAMMOGRAM FOR MALIGNANT NEOPLASM OF BREAST: ICD-10-CM

## 2025-02-05 DIAGNOSIS — I10 ESSENTIAL HYPERTENSION: ICD-10-CM

## 2025-02-05 DIAGNOSIS — Z71.6 ENCOUNTER FOR TOBACCO USE CESSATION COUNSELING: ICD-10-CM

## 2025-02-05 PROBLEM — G31.9 DIFFUSE BRAIN ATROPHY: Status: RESOLVED | Noted: 2021-05-06 | Resolved: 2025-02-05

## 2025-02-05 PROBLEM — M48.062 SPINAL STENOSIS OF LUMBAR REGION WITH NEUROGENIC CLAUDICATION: Status: RESOLVED | Noted: 2020-09-18 | Resolved: 2025-02-05

## 2025-02-05 PROBLEM — Z13.220 SCREENING, LIPID: Status: RESOLVED | Noted: 2022-02-14 | Resolved: 2025-02-05

## 2025-02-05 PROBLEM — M53.86 DECREASED RANGE OF MOTION OF LUMBAR SPINE: Status: RESOLVED | Noted: 2020-10-12 | Resolved: 2025-02-05

## 2025-02-05 PROBLEM — F02.B4 MODERATE ALZHEIMER'S DEMENTIA WITH ANXIETY (HCC): Status: ACTIVE | Noted: 2025-02-05

## 2025-02-05 PROBLEM — R10.13 EPIGASTRIC PAIN: Status: RESOLVED | Noted: 2024-05-27 | Resolved: 2025-02-05

## 2025-02-05 PROBLEM — M54.40 CHRONIC LEFT-SIDED LOW BACK PAIN WITH SCIATICA: Status: RESOLVED | Noted: 2020-10-12 | Resolved: 2025-02-05

## 2025-02-05 PROBLEM — G89.29 CHRONIC LEFT-SIDED LOW BACK PAIN WITH SCIATICA: Status: RESOLVED | Noted: 2020-10-12 | Resolved: 2025-02-05

## 2025-02-05 PROBLEM — F11.20 OPIOID DEPENDENCE WITH CURRENT USE (HCC): Status: RESOLVED | Noted: 2021-10-07 | Resolved: 2025-02-05

## 2025-02-05 PROBLEM — M51.369 DDD (DEGENERATIVE DISC DISEASE), LUMBAR: Status: RESOLVED | Noted: 2020-09-18 | Resolved: 2025-02-05

## 2025-02-05 PROBLEM — G30.9 MODERATE ALZHEIMER'S DEMENTIA WITH ANXIETY (HCC): Status: ACTIVE | Noted: 2025-02-05

## 2025-02-05 PROBLEM — F13.20 BENZODIAZEPINE DEPENDENCE (HCC): Status: RESOLVED | Noted: 2023-10-17 | Resolved: 2025-02-05

## 2025-02-05 PROBLEM — M54.50 LUMBAR PAIN: Status: RESOLVED | Noted: 2020-10-12 | Resolved: 2025-02-05

## 2025-02-05 PROBLEM — M79.604 BILATERAL LEG PAIN: Status: RESOLVED | Noted: 2020-09-01 | Resolved: 2025-02-05

## 2025-02-05 PROBLEM — I70.0 AORTIC ATHEROSCLEROSIS: Status: RESOLVED | Noted: 2020-09-10 | Resolved: 2025-02-05

## 2025-02-05 PROBLEM — R79.89 ELEVATED TROPONIN: Status: RESOLVED | Noted: 2024-05-27 | Resolved: 2025-02-05

## 2025-02-05 PROBLEM — M79.605 BILATERAL LEG PAIN: Status: RESOLVED | Noted: 2020-09-01 | Resolved: 2025-02-05

## 2025-02-05 PROBLEM — G43.709 CHRONIC MIGRAINE WITHOUT AURA WITHOUT STATUS MIGRAINOSUS, NOT INTRACTABLE: Status: RESOLVED | Noted: 2020-09-01 | Resolved: 2025-02-05

## 2025-02-05 RX ORDER — ESCITALOPRAM OXALATE 5 MG/1
5 TABLET ORAL NIGHTLY
Qty: 90 TABLET | Refills: 0 | Status: SHIPPED
Start: 2025-02-05

## 2025-02-05 RX ORDER — ESCITALOPRAM OXALATE 5 MG/1
5 TABLET ORAL NIGHTLY
Qty: 90 TABLET | Refills: 0 | Status: SHIPPED | OUTPATIENT
Start: 2025-02-05 | End: 2025-02-05

## 2025-02-05 SDOH — ECONOMIC STABILITY - FOOD INSECURITY: FOOD INSECURITY: Z59.41

## 2025-02-05 NOTE — PROGRESS NOTES
Breast Care  nurse called patient with malignant results from her right stereotactic and left US guided breast biopsies performed on 10/19/20.  Radiologist recommended surgical consult and 6 month diagnostic mammogram.  Pt verbalized understanding and call was made to Destini Lan RN, Breast Cancer Nurse Navigator to assist patient in obtaining this appointment as pt prefers to stay with [facility] for follow up care.  Pt states she has had no bleeding and minimal discomfort from her breast biopsy.  All questions were answered and emotional support was offered.     PATHOLOGY:  ** PRELIMINARY DIAGNOSIS **     A. Right breast, stereotactic core biopsy, \"site A\", medial, for  calcifications:  -Breast tissue with usual duct hyperplasia and microcalcifications     B. Right breast, stereotactic core biopsy, \"site B\", lateral, for  calcifications:  -Breast tissue with focal atypical lobular hyperplasia, mild stromal  fibrosis and microcalcifications     C. Left breast, ultrasound-guided core biopsy at 11:00, 3 cm from  nipple, \"site C\", for mass:  -Breast tissue with mild stromal fibrosis, edge of cyst and  microcalcifications     D. Left breast, ultrasound-guided core biopsy at 12:00, 6 cm from  nipple, \"site D\", for mass:  -Poorly differentiated malignant neoplasm is present  -Immunohistochemical stains are pending     CATEGORY:Malignant.     CONCORDANT: Yes.     RECOMMENDATION:  1. Surgical consultation. This will be facilitated by the radiology  department.  2. 6 month follow-up diagnostic right breast mammogram to evaluate  stability of biopsied calcifications (in the absence of surgical  intervention) and posterior asymmetry which could not be replicated during  time of biopsy.     NOTIFICATION: Results and recommendations were discussed with the  patient by the radiology RNShavon on 10/21/20 at 14:35.   Patient Office Visit    ASSESSMENT AND PLAN:   1. Routine physical examination  Note: unable to order preventative screening as patient kept stating she has lot of testing done and does not want anything today  - ALT(SGPT); Future  - AST (SGOT); Future  - Basic Metabolic Panel (8); Future  - Lipid Panel; Future  - CBC With Differential With Platelet; Future  - TSH W Reflex To Free T4; Future    2. Anxiety  Note: Will start Lexapro and will reach out to the psychiatrist as well to see if there are any other options for the patient.  Considered Cymbalta but she has an allergic reaction of hallucinations with it.  Discussed with the caregiver that it may take some time to see an effect.  - TSH W Reflex To Free T4; Future  - escitalopram 5 MG Oral Tab; Take 1 tablet (5 mg total) by mouth at bedtime.  Dispense: 90 tablet; Refill: 0    3. Essential hypertension  Note: Continue amlodipine and enalapril.  - Basic Metabolic Panel (8); Future  - TSH W Reflex To Free T4; Future    4. Encounter for tobacco use cessation counseling  note: Discussed the risks of smoking and patient does not want to consider stopping at all    5. Chronic obstructive pulmonary disease, unspecified COPD type (HCC)  Note: Takes Trelegy and advised to stop smoking    6. Encounter for screening mammogram for malignant neoplasm of breast  - [facility] RANI 2D+3D SCREENING BILAT (CPT=77067/12678); Future    7. Mild dementia with anxiety, unspecified dementia type (HCC)  Note: Reviewed the last neurologist note and patient was diagnosed with mild cognitive impairment.  Patient does have severe anxiety but it seems that her dementia is much worse compared to last note.  New referral to the neurologist provided and advised the caregiver to have her daughter come in the next visit to provide more information  - Neuro Referral - In Network    Patient will benefit from a 24 hour caregiver or a memory unit  We will have [facility] follow up with the patient's caregiver as  well     Patient/Caregiver Education: Patient/Caregiver Education: There are no barriers to learning. Medical education done. Outcome: Patient verbalizes understanding. Patient is notified to call with any questions, complications, allergies, or worsening or changing symptoms.  Patient is to call with any side effects or complications from the treatments as a result of today.      Reviewed Past Medical History and   Patient Active Problem List   Diagnosis    Essential hypertension    Age-related osteoporosis without current pathological fracture    Tobacco use    COPD (chronic obstructive pulmonary disease) ([facility])    Mitral valve regurgitation    Anxiety    Mild dementia with anxiety ([facility])       Orders Placed This Encounter   Procedures    ALT(SGPT)     Standing Status:   Future     Standing Expiration Date:   2/5/2026    AST (SGOT)     Standing Status:   Future     Standing Expiration Date:   2/5/2026    Basic Metabolic Panel (8)     Standing Status:   Future     Standing Expiration Date:   2/5/2026    Lipid Panel     Standing Status:   Future     Standing Expiration Date:   2/5/2026    CBC With Differential With Platelet     Standing Status:   Future     Standing Expiration Date:   2/5/2026    TSH W Reflex To Free T4     Standing Status:   Future     Standing Expiration Date:   2/5/2026     Requested Prescriptions     Signed Prescriptions Disp Refills    escitalopram 5 MG Oral Tab 90 tablet 0     Sig: Take 1 tablet (5 mg total) by mouth at bedtime.         Dansiha Mcgee DO  CC:  Chief Complaint   Patient presents with    Wellness Visit     Lynnette [id number]         HPI:   Quyen Wolf is a 69-year-old female who presents to [facility] care.  Patient was 30 minutes late for her 30-minute appointment.  Unable to reschedule as the patient came in with the caregiver and the caregiver stated that it is very difficult for her to come in due to patient's concerns    Severe anxiety: Patient states that she has severe  anxiety and it is difficult for her to leave the house.  She does not socialize with anyone.  She has a daughter who lives a few minutes away from her but does not really visit her too much.  The caregiver who is present today is new and only comes in once a week for a few hours.  She has another caregiver who comes twice a week but per the patient she does not want to bring that caregiver.  This current caregiver new minimal about the patient and did not know much of her medications today.  When asked patient if I could reach out to her psychiatrist to discuss anxiety patient got nervous and said that the psychiatrist is her private doctor and she is afraid that if I contact them we will stop the xanax   Memory loss: Patient's caregiver feels that she has a good memory but while talking to the patient it seemed like that she kept repeating the same thing over and over again.  Chronic pain: Patient kept stating that she has pain all over her body and she needs narcotics to help with the pain.  Hypertension: Has been taking medications  Tobacco use: Continues to smoke and she does not want quit    Past Medical History:    Anxiety state, unspecified    Fibromyalgia    High blood pressure    Unspecified essential hypertension       History reviewed. No pertinent surgical history.    Social History:  Social History     Socioeconomic History    Marital status: Single   Tobacco Use    Smoking status: Every Day     Current packs/day: 0.50     Average packs/day: 0.5 packs/day for 40.0 years (20.0 ttl pk-yrs)     Types: Cigarettes    Smokeless tobacco: Current   Vaping Use    Vaping status: Never Used   Substance and Sexual Activity    Alcohol use: No    Drug use: No     Social Drivers of Health     Food Insecurity: Food Insecurity Present (2/5/2025)    NCSS - Food Insecurity     Worried About Running Out of Food in the Last Year: No     Ran Out of Food in the Last Year: Yes   Transportation Needs: No Transportation Needs  (2/5/2025)    NCSS - Transportation     Lack of Transportation: No   Housing Stability: Not At Risk (2/5/2025)    NCSS - Housing/Utilities     Has Housing: Yes     Worried About Losing Housing: No     Unable to Get Utilities: No     Family History:  Family History   Problem Relation Age of Onset    No Known Problems Father     No Known Problems Mother      Allergies:  Allergies[1]  Current Meds:  Medications Ordered Prior to Encounter[2]      REVIEW OF SYSTEMS     Difficult to get  any review of symptoms given patient's mental status and she kept repeating herself       Constitutional: no fatigue normal energy no weight changes   HENT: normal sinuses and no mouth issues   Eyes: . normal vision no eye pain   Respiratory: normal respirations no cough   Cardiovascular: no CP, or palpitations   Gastrointestinal: normal bowels and no abd pains   Genitourinary:  normal urination no hematuria, no frequency   Musculoskeletal: no pains in arms/legs, normal range of motion   Skin: no rashes or skin lesions that are new   Neurological:  no weakness, no numbness, normal gait   Hematological:  no bruises or bleeding   Psychiatric/Behavioral: normal mood no anxiety normal behavior     Tobacco:  Social History     Tobacco Use   Smoking Status Every Day    Current packs/day: 0.50    Average packs/day: 0.5 packs/day for 40.0 years (20.0 ttl pk-yrs)    Types: Cigarettes   Smokeless Tobacco Current     E-Cigarettes/Vaping       Questions Responses    E-Cigarette Use Never User           Tobacco cessation counseling for <3 minutes.      /76 (BP Location: Right arm, Patient Position: Sitting, Cuff Size: adult)   Pulse 76   Resp 16   Wt 118 lb (53.5 kg)   SpO2 97%   BMI 21.93 kg/m²     PHYSICAL EXAM:   Constitutional: Vital signs reviewed as noted, thin appearing, in no acute distress.   HENT: NCAT, bilateral ear canal and tympanic membrane appear normal  Eyes: pupils reactive bilaterally  Neck: No  thyroidmegaly  Cardiovascular: nl s1 s2   Pulmonary/Chest: CTA bilaterally with no wheezes  Abdominal: Soft NT normal Bowel sounds (supine) did not want to lay down  Extremities: no pedal edema   Neurological:  moving all extremities   Psychiatric:anxious appearing             [1]   Allergies  Allergen Reactions    Duloxetine Hcl HALLUCINATION    Hydrocodone UNKNOWN    Sulfa Antibiotics UNKNOWN    Sulfur OTHER (SEE COMMENTS) and UNKNOWN    Salicylic Acid-Sulfur NAUSEA ONLY   [2]   Current Outpatient Medications on File Prior to Visit   Medication Sig Dispense Refill    ALPRAZolam 0.5 MG Oral Tab Take 1 tablet (0.5 mg total) by mouth 6 (six) times daily. 42 tablet 3    famotidine 20 MG Oral Tab Take 1 tablet (20 mg total) by mouth 2 (two) times daily. 60 tablet 0    sucralfate 1 GM/10ML Oral Suspension Take 10 mL (1 g total) by mouth 4 (four) times daily before meals and nightly (2200). 1200 mL 0    TRELEGY ELLIPTA 100-62.5-25 MCG/ACT Inhalation Aerosol Powder, Breath Activated Inhale 1 puff into the lungs daily.      amLODIPine 5 MG Oral Tab Take 1 tablet (5 mg total) by mouth 2 (two) times a day. 180 tablet 1    enalapril 20 MG Oral Tab Take 1 tablet (20 mg total) by mouth 2 (two) times daily. 180 tablet 1    albuterol 108 (90 Base) MCG/ACT Inhalation Aero Soln Inhale 2 puffs into the lungs every 4 to 6 hours as needed for Wheezing. 18 g 3     No current facility-administered medications on file prior to visit.

## 2025-02-05 NOTE — PATIENT INSTRUCTIONS
I have ordered blood work for you    I have referred you to the neurologist for the memory    I have started you on escitalopram that you take at night time. If tolerating the medication, you can take the lexapro 5 mg twice a day. She needs to follow up with the psychiatrist and I will send a message    She should not be staying alone and needs 24 hour caregiver.    I would like the daughter to come next visit (someone who knows the patient well)    See me back in 6 weeks for a follow up

## 2025-03-12 ENCOUNTER — TELEPHONE (OUTPATIENT)
Dept: INTERNAL MEDICINE CLINIC | Facility: CLINIC | Age: 70
End: 2025-03-12

## 2025-03-12 ENCOUNTER — LAB ENCOUNTER (OUTPATIENT)
Dept: LAB | Age: 70
End: 2025-03-12
Attending: INTERNAL MEDICINE
Payer: MEDICARE

## 2025-03-12 DIAGNOSIS — Z00.00 ROUTINE PHYSICAL EXAMINATION: ICD-10-CM

## 2025-03-12 DIAGNOSIS — F51.01 PRIMARY INSOMNIA: ICD-10-CM

## 2025-03-12 DIAGNOSIS — R73.03 PREDIABETES: ICD-10-CM

## 2025-03-12 DIAGNOSIS — F41.9 ANXIETY: ICD-10-CM

## 2025-03-12 DIAGNOSIS — I10 ESSENTIAL HYPERTENSION: ICD-10-CM

## 2025-03-12 LAB
ALT SERPL-CCNC: 71 U/L
ANION GAP SERPL CALC-SCNC: 10 MMOL/L (ref 0–18)
AST SERPL-CCNC: 43 U/L (ref ?–34)
BASOPHILS # BLD AUTO: 0.08 X10(3) UL (ref 0–0.2)
BASOPHILS NFR BLD AUTO: 0.7 %
BUN BLD-MCNC: 16 MG/DL (ref 9–23)
CALCIUM BLD-MCNC: 9.4 MG/DL (ref 8.7–10.6)
CHLORIDE SERPL-SCNC: 98 MMOL/L (ref 98–112)
CHOLEST SERPL-MCNC: 229 MG/DL (ref ?–200)
CO2 SERPL-SCNC: 26 MMOL/L (ref 21–32)
CREAT BLD-MCNC: 0.93 MG/DL
EGFRCR SERPLBLD CKD-EPI 2021: 66 ML/MIN/1.73M2 (ref 60–?)
EOSINOPHIL # BLD AUTO: 0.1 X10(3) UL (ref 0–0.7)
EOSINOPHIL NFR BLD AUTO: 0.9 %
ERYTHROCYTE [DISTWIDTH] IN BLOOD BY AUTOMATED COUNT: 15 %
EST. AVERAGE GLUCOSE BLD GHB EST-MCNC: 123 MG/DL (ref 68–126)
GLUCOSE BLD-MCNC: 104 MG/DL (ref 70–99)
HBA1C MFR BLD: 5.9 % (ref ?–5.7)
HCT VFR BLD AUTO: 42.3 %
HDLC SERPL-MCNC: 77 MG/DL (ref 40–59)
HGB BLD-MCNC: 14.4 G/DL
IMM GRANULOCYTES # BLD AUTO: 0.03 X10(3) UL (ref 0–1)
IMM GRANULOCYTES NFR BLD: 0.3 %
LDLC SERPL CALC-MCNC: 122 MG/DL (ref ?–100)
LYMPHOCYTES # BLD AUTO: 1.52 X10(3) UL (ref 1–4)
LYMPHOCYTES NFR BLD AUTO: 14 %
MCH RBC QN AUTO: 30.6 PG (ref 26–34)
MCHC RBC AUTO-ENTMCNC: 34 G/DL (ref 31–37)
MCV RBC AUTO: 90 FL
MONOCYTES # BLD AUTO: 0.69 X10(3) UL (ref 0.1–1)
MONOCYTES NFR BLD AUTO: 6.4 %
NEUTROPHILS # BLD AUTO: 8.43 X10 (3) UL (ref 1.5–7.7)
NEUTROPHILS # BLD AUTO: 8.43 X10(3) UL (ref 1.5–7.7)
NEUTROPHILS NFR BLD AUTO: 77.7 %
NONHDLC SERPL-MCNC: 152 MG/DL (ref ?–130)
OSMOLALITY SERPL CALC.SUM OF ELEC: 279 MOSM/KG (ref 275–295)
PLATELET # BLD AUTO: 349 10(3)UL (ref 150–450)
POTASSIUM SERPL-SCNC: 4.2 MMOL/L (ref 3.5–5.1)
RBC # BLD AUTO: 4.7 X10(6)UL
SODIUM SERPL-SCNC: 134 MMOL/L (ref 136–145)
TRIGL SERPL-MCNC: 177 MG/DL (ref 30–149)
TSI SER-ACNC: 0.88 UIU/ML (ref 0.55–4.78)
VLDLC SERPL CALC-MCNC: 31 MG/DL (ref 0–30)
WBC # BLD AUTO: 10.9 X10(3) UL (ref 4–11)

## 2025-03-12 PROCEDURE — 36415 COLL VENOUS BLD VENIPUNCTURE: CPT

## 2025-03-12 PROCEDURE — 84443 ASSAY THYROID STIM HORMONE: CPT

## 2025-03-12 PROCEDURE — 80048 BASIC METABOLIC PNL TOTAL CA: CPT

## 2025-03-12 PROCEDURE — 84450 TRANSFERASE (AST) (SGOT): CPT

## 2025-03-12 PROCEDURE — 84460 ALANINE AMINO (ALT) (SGPT): CPT

## 2025-03-12 PROCEDURE — 83036 HEMOGLOBIN GLYCOSYLATED A1C: CPT

## 2025-03-12 PROCEDURE — 80061 LIPID PANEL: CPT

## 2025-03-12 PROCEDURE — 85025 COMPLETE CBC W/AUTO DIFF WBC: CPT

## 2025-03-12 RX ORDER — TRAZODONE HYDROCHLORIDE 50 MG/1
50 TABLET ORAL NIGHTLY
Qty: 30 TABLET | Refills: 0 | Status: SHIPPED | OUTPATIENT
Start: 2025-03-12

## 2025-03-12 NOTE — TELEPHONE ENCOUNTER
SV: Did pt discuss with you that she would start taking 1 pill daily instead of 0.5 daily? Prescription was originally sent in for 0.5.     LOV TODAY:    1. Primary insomnia  Note: Patient has been benefiting from the trazodone therefore refill provided.  She follows with his psychiatrist on a monthly basis  - traZODone 50 MG Oral Tab; Take 0.5 tablets (25 mg total) by mouth nightly.  Dispense: 30 tablet; Refill: 0

## 2025-03-12 NOTE — TELEPHONE ENCOUNTER
Yes we did discuss this. I have updated the dose, she can  the new medication after she is done with the current pills. She is very sensitive to medications per her psychiatrist so she can try one full tablet tonight, but if she does not tolerate it then she can go back to half a tablet. She lives alone and manages her own medications  Danisha Mcgee DO

## 2025-03-12 NOTE — TELEPHONE ENCOUNTER
Lynnette   Caregiver  # [phone]    Lynnette says that a script for 'Trazodone 50 mg' was sent in but the instructions are incorrect. Lynnette says the pt discussed w/ SV that she would start taking 1 pill daily vs 0.5 daily but the script was sent in for 0.5 daily.    The pt will need a new script for 1 pill daily.     [id number] [place], [place] - [address] [facility], [phone], [phone]

## 2025-03-13 DIAGNOSIS — I71.40 ABDOMINAL AORTIC ANEURYSM (AAA) 3.0 CM TO 5.0 CM IN DIAMETER IN FEMALE: ICD-10-CM

## 2025-03-13 DIAGNOSIS — R79.89 ELEVATED LFTS: Primary | ICD-10-CM

## 2025-03-13 NOTE — TELEPHONE ENCOUNTER
Spoke with Lynnette to inform of Trazodone medication recommendation per Dr. Mcgee. Lynnette calvillo/evangelina Lynnette voiced concerns of patient. She states that she's concerned if patient is taking all of her medications as prescribed. Pt has high anxiety, especially when caregiver leaves. She panics. Caregiver doesn't think she should be by herself if she's that anxious when she leaves. Pt has been sleeping in her living room for the past 5 years and won't sleep in her bedroom because she's scared. Pt also mentions that she doesn't drive, but she does. Caregiver was wondering if there are any recommendations for community a patient could go to although patient states she wants to live alone? Also, wondering if labs could be relayed to patient's daughter? This RN mentioned that per pt's wishes, she doesn't want her daughter involved in her care. Unsure if it'd be something that Dr. Mcgee would want to discuss with patient in a VV.     Relayed lab results and recommendations per Dr. Mcgee. Pt does take tylenol for fibromyalgia pain, but hasn't complained about abdominal pain. Lynnette calvillo/adonay.     SV: FYI    Duration of call: 44 min

## 2025-03-13 NOTE — TELEPHONE ENCOUNTER
Discussed with patient's daughter. She visits her often. She will come with the patient during her next appointment. Per daughter she has been like this for years and has been stable. She does not see any safety concern.     Danisha Mcgee DO

## 2025-03-13 NOTE — PROGRESS NOTES
Dear RN, please let the patient know (patient has anxiety and dementia, patient has allowed to speak with her caregiver Lynnette)  1.  Liver function called AST and ALT are elevated.  I would like to repeat the test again in 2 weeks.   Any mention about abdominal pain?  She has a history of an abdominal aneurysm which is weakening of the heart muscles therefore she will need an ultrasound so I have ordered that as well.  The weakening is from the smoking  2.  Her sodium levels have improved from before.  Her kidney function is normal.  Her potassium is normal.  3.  Her bad cholesterol called LDL is elevated but her good cholesterol called HDL is high which is great as it provides some protection against heart disease and stroke  4.  She is mildly prediabetic.  We will just continue to monitor for now.  5.  Blood count is normal and thyroid function is normal  Please let me know if you have any questions  Danisha Mcgee DO

## 2025-03-13 NOTE — TELEPHONE ENCOUNTER
Left message for Lynnette to call back.     Left detailed message for patient, OK per verbal release (name identified).

## 2025-03-13 NOTE — TELEPHONE ENCOUNTER
I will call the daughter now as patient is alone and providing different information     Danisha Mcgee DO

## 2025-04-08 ENCOUNTER — TELEPHONE (OUTPATIENT)
Dept: INTERNAL MEDICINE CLINIC | Facility: CLINIC | Age: 70
End: 2025-04-08

## 2025-04-08 NOTE — TELEPHONE ENCOUNTER
Spoke to patient who reports swelling to BLE and loss of taste. Patient unable to remember onset.     Patient states she has been feeling shortness of breath at rest. Patient denies recent weight gain or cough. Denies pitting edema but notices significant swelling. Pt denies discoloration to LE or warmth to touch. Patient does not have pulse ox monitor so saturations unknown at home.     Given patient's increased shortness of breath at rest and reported BLE swelling and mitral valve regurgitation, writer recommended patient proceed to UC to have symptoms assessed. Patient kindly declined and would like PCP recommendations.

## 2025-04-08 NOTE — TELEPHONE ENCOUNTER
Pt has been having swollen knees & legs for an unknown length of time it was actually noticed by family members. No pain when walking or sitting.    Also has lost her sense of taste.    Best person to call back is Lynnette her care taker at [phone] (she called in but she is not on the contact list)

## 2025-04-08 NOTE — TELEPHONE ENCOUNTER
I agree with UC recommendations and follow up appointment. She needs to be evaluated for her symptoms  Danisha Mcgee DO

## 2025-04-09 ENCOUNTER — HOSPITAL ENCOUNTER (OUTPATIENT)
Age: 70
Discharge: HOME | End: 2025-04-09
Attending: EMERGENCY MEDICINE
Payer: MEDICARE

## 2025-04-09 VITALS
BODY MASS INDEX: 21 KG/M2 | WEIGHT: 110 LBS | TEMPERATURE: 98 F | DIASTOLIC BLOOD PRESSURE: 93 MMHG | SYSTOLIC BLOOD PRESSURE: 107 MMHG | OXYGEN SATURATION: 98 % | HEART RATE: 82 BPM | RESPIRATION RATE: 22 BRPM

## 2025-04-09 DIAGNOSIS — M25.462 EFFUSION OF BOTH KNEE JOINTS: Primary | ICD-10-CM

## 2025-04-09 DIAGNOSIS — M25.461 EFFUSION OF BOTH KNEE JOINTS: Primary | ICD-10-CM

## 2025-04-09 PROCEDURE — 99213 OFFICE O/P EST LOW 20 MIN: CPT

## 2025-04-09 RX ORDER — METHYLPREDNISOLONE 4 MG/1
TABLET ORAL
Qty: 1 EACH | Refills: 0 | Status: SHIPPED | OUTPATIENT
Start: 2025-04-09

## 2025-04-09 NOTE — ED PROVIDER NOTES
Patient Seen in: [facility]      History     Chief Complaint   Patient presents with    Dental Problem    Knee Pain     Stated Complaint: Swelling; Pain    Subjective:   HPI      69-year-old female presents to the immediate care with complaints of bilateral knee pain and swelling.  Denies any trauma or falls.  Denies any fevers or chills.  Patient is able to weight-bear and ambulate.  Patient also has a history of fibromyalgia.  She previously was on tramadol but was taken off of this by her primary care doctor due to addictive issues.  Patient also has complaints of pain to her right lower gumline but she is seeing an oral surgeon for this.  Patient also states that she has been dealing with feeling anxiety and fear.  She is not suicidal or homicidal.  She does have a psychiatrist.  She does take alprazolam for this.    Objective:     Past Medical History:    Anxiety state, unspecified    Fibromyalgia    High blood pressure    Unspecified essential hypertension              History reviewed. No pertinent surgical history.             Social History     Socioeconomic History    Marital status: Single   Tobacco Use    Smoking status: Every Day     Current packs/day: 0.50     Average packs/day: 0.5 packs/day for 40.0 years (20.0 ttl pk-yrs)     Types: Cigarettes    Smokeless tobacco: Current   Vaping Use    Vaping status: Never Used   Substance and Sexual Activity    Alcohol use: No    Drug use: No     Social Drivers of Health     Food Insecurity: Food Insecurity Present (2/5/2025)    NCSS - Food Insecurity     Worried About Running Out of Food in the Last Year: No     Ran Out of Food in the Last Year: Yes   Transportation Needs: No Transportation Needs (2/5/2025)    NCSS - Transportation     Lack of Transportation: No   Housing Stability: Not At Risk (2/5/2025)    NCSS - Housing/Utilities     Has Housing: Yes     Worried About Losing Housing: No     Unable to Get Utilities: No              Review of  Systems    Positive for stated complaint: Swelling; Pain  Other systems are as noted in HPI.  Constitutional and vital signs reviewed.      All other systems reviewed and negative except as noted above.    Physical Exam     ED Triage Vitals   BP 04/09/25 1134 (!) 107/93   Pulse 04/09/25 1134 82   Resp 04/09/25 1134 22   Temp 04/09/25 1144 97.8 °F (36.6 °C)   Temp src 04/09/25 1134 Oral   SpO2 04/09/25 1134 98 %   O2 Device 04/09/25 1134 None (Room air)       Current Vitals:   Vital Signs  BP: (!) 107/93  Pulse: 82  Resp: 22  Temp: 97.8 °F (36.6 °C)  Temp src: Tympanic    Oxygen Therapy  SpO2: 98 %  O2 Device: None (Room air)        Physical Exam  General: Alert and oriented. No acute distress.  HEENT: Normocephalic. No evidence of trauma. Extraocular movements are intact.  Cardiovascular exam: Regular rate and rhythm  Lungs: Clear to auscultation bilaterally.  Abdomen: Soft, nondistended, nontender.  Extremities: No evidence of deformity. No clubbing or cyanosis.  Bilateral knee effusions are noted.  No erythema or warmth.  I am able to passively flex and extend her knees without limitation in range of motion or obvious pain.  Neuro: No focal deficit is noted.    ED Course   Labs Reviewed - No data to display  Clinically is not consistent with a septic arthritis.  Patient is noted to have bilateral effusions which may be reactive her inflammatory nature.  Patient will be discharged home with a Medrol Dosepak.  She will begin follow-up with [facility].  She will be given follow-up with [facility] crisis and referral hotline.          MDM   Patient was screened and evaluated during this visit.   As a treating physician attending to the patient, I determined, within reasonable clinical confidence and prior to discharge, that an emergency medical condition was not or was no longer present.  There was no indication for further evaluation, treatment or admission on an emergency basis.  Comprehensive verbal and  written discharge and follow-up instructions were provided to help prevent relapse or worsening.  Patient was instructed to follow-up with her primary care provider for further evaluation and treatment, but to return immediately to the ER for worsening, concerning, new, changing or persisting symptoms.  I discussed the case with the patient and they had no questions, complaints, or concerns.  Patient felt comfortable going home.    ^^Please note that this report has been produced using speech recognition software and may contain errors related to that system including, but not limited to, errors in grammar, punctuation, and spelling, as well as words and phrases that possibly may have been recognized inappropriately.  If there are any questions or concerns, contact the dictating provider for clarification      MDM    Disposition and Plan     Clinical Impression:  1. Effusion of both knee joints         Disposition:  Discharge  4/9/2025 11:56 am    Follow-up:  Danisha Mcgee,   [address] [facility] [address]  [facility] access hospital [id number] [phone]          [facility], [address] [place] [id number] [phone]  Schedule an appointment as soon as possible for a visit   As needed, If symptoms worsen          Medications Prescribed:  Current Discharge Medication List        START taking these medications    Details   methylPREDNISolone (MEDROL) 4 MG Oral Tablet Therapy Pack Dosepack: take as directed  Qty: 1 each, Refills: 0                 Supplementary Documentation:

## 2025-04-09 NOTE — DISCHARGE INSTRUCTIONS
Follow up with [facility] orthopedics if knee pain does not improve  Take medrol dose pack as directed  Call [facility] Referral line for outpatient treatment of anxiety  Continue your home medications

## 2025-04-09 NOTE — ED INITIAL ASSESSMENT (HPI)
Pt presents to the IC with c/o atraumatic bilateral knee swelling and pain. Pain radiates from the upper tight to the lower legs. Hx of fibromyalgia but patient notes she was taken off her tramadol medication by her PMD. Pt has chronic SOB. Pt has pain to the lower right gums in her mouth and has been using oragel for oral pain.

## 2025-04-09 NOTE — TELEPHONE ENCOUNTER
Called patient to relay PCP recommendations. Patient kindly refused to go to urgent care and is aware she has an appointment with Dr. Bach on 4/18 for symptoms discussed. Patient was asking if she could be seen sooner, Dr. Bach has availability sooner, but patient requested this RN call her caregiver, Lynnette, to see if she can take her sooner. If unable to reach, will keep 4/18 appointment.     This RN attempted to call Lynnette, caregiver, per patient request. Unable to reach. Left message to call back.     PSR - if Lynnette, caregiver, calls back, please see if patient can be seen on 4/17 with Dr. Bach. If not, OK to keep 4/18 appointment with Dr. Bach. TY

## 2025-04-09 NOTE — TELEPHONE ENCOUNTER
Reached out to Lynnette as no one had contacted us to try and reschedule sooner.    Care giver Lynnette informed that pt was already seen at the  today and will keep current appt as a f/u from .

## 2025-04-18 ENCOUNTER — HOSPITAL ENCOUNTER (OUTPATIENT)
Dept: ULTRASOUND IMAGING | Age: 70
Discharge: HOME | End: 2025-04-18
Attending: INTERNAL MEDICINE
Payer: MEDICARE

## 2025-04-18 ENCOUNTER — OFFICE VISIT (OUTPATIENT)
Dept: INTERNAL MEDICINE CLINIC | Facility: CLINIC | Age: 70
End: 2025-04-18
Payer: MEDICARE

## 2025-04-18 VITALS
WEIGHT: 128.38 LBS | SYSTOLIC BLOOD PRESSURE: 100 MMHG | BODY MASS INDEX: 24.24 KG/M2 | OXYGEN SATURATION: 97 % | TEMPERATURE: 98 F | DIASTOLIC BLOOD PRESSURE: 60 MMHG | HEART RATE: 79 BPM | HEIGHT: 61 IN

## 2025-04-18 DIAGNOSIS — M79.89 SYMPTOM OF LEG SWELLING: Primary | ICD-10-CM

## 2025-04-18 DIAGNOSIS — F41.9 ANXIETY: ICD-10-CM

## 2025-04-18 DIAGNOSIS — I71.40 ABDOMINAL AORTIC ANEURYSM (AAA) 3.0 CM TO 5.0 CM IN DIAMETER IN FEMALE: ICD-10-CM

## 2025-04-18 PROCEDURE — 76706 US ABDL AORTA SCREEN AAA: CPT | Performed by: INTERNAL MEDICINE

## 2025-04-18 PROCEDURE — 99215 OFFICE O/P EST HI 40 MIN: CPT | Performed by: INTERNAL MEDICINE

## 2025-04-18 RX ORDER — GABAPENTIN 100 MG/1
100 CAPSULE ORAL 2 TIMES DAILY PRN
Qty: 60 CAPSULE | Refills: 2 | Status: SHIPPED | OUTPATIENT
Start: 2025-04-18 | End: 2025-07-17

## 2025-04-18 RX ORDER — DONEPEZIL HYDROCHLORIDE 5 MG/1
5 TABLET, ORALLY DISINTEGRATING ORAL NIGHTLY
Qty: 90 TABLET | Refills: 0 | Status: SHIPPED | OUTPATIENT
Start: 2025-04-18

## 2025-04-18 RX ORDER — ATORVASTATIN CALCIUM 10 MG/1
10 TABLET, FILM COATED ORAL NIGHTLY
Qty: 90 TABLET | Refills: 3 | Status: SHIPPED | OUTPATIENT
Start: 2025-04-18 | End: 2026-04-13

## 2025-04-18 NOTE — PROGRESS NOTES
Subjective:   Quyen Wolf is a 69 year old female who presents for Follow - Up     The patient has history of fibromyalgia, anxiety disorder, smoking and hypertension.    Complains of pain all over the body.  In the past the patient was on opioids.  Last time he took tramadol was 1 year ago.    The patient is still smokes cigarettes despite NicoDerm being prescribed.  Lately the patient has been getting trazodone 50 mg at bedtime.    The patient currently lives by herself.  Caretaker comes 4 times a week.  The patient feels anxious when she is alone.    She has poor sleep.  That is why trazodone has been initiated which helps for few hours.    She follows with a psychiatrist with televisits.  I believe, the patient needs behavioral therapist.    History/Other:    Chief Complaint Reviewed and Verified  No Further Nursing Notes to   Review  Tobacco Reviewed  Allergies Reviewed  Medications Reviewed    Medical History Reviewed  Surgical History Reviewed  OB Status Reviewed    Family History Reviewed  Social History Reviewed         Tobacco:  Tobacco Use[1]  E-Cigarettes/Vaping       Questions Responses    E-Cigarette Use Never User           Tobacco cessation counseling for 3-10 minutes (add E/M code #82188).      Current Medications[2]      Review of Systems:  Pertinent items are noted in HPI.  A comprehensive review of systems was negative.      Objective:   /60 (BP Location: Left arm, Patient Position: Sitting, Cuff Size: adult)   Pulse 79   Temp 98 °F (36.7 °C) (Temporal)   Ht 5' 1\" (1.549 m)   Wt 128 lb 6.4 oz (58.2 kg)   SpO2 97%   BMI 24.26 kg/m²  Estimated body mass index is 24.26 kg/m² as calculated from the following:    Height as of this encounter: 5' 1\" (1.549 m).    Weight as of this encounter: 128 lb 6.4 oz (58.2 kg).    BP Readings from Last 3 Encounters:   04/18/25 100/60   04/09/25 (!) 107/93   03/12/25 124/78     The patient is accompanied by her caretaker named  Hansa.  General condition: Anxious looking with increased psychomotor activity  HEENT: Atraumatic normocephalic  No cervical or submandibular lymphadenopathy  Chest: Clear to auscultate  Heart: S1-S2 ,no murmur  Abdomen: Soft non tender, no palpable organomegaly  Pain points are identified in the shoulder neck lower back and thigh area.  Neurological examination: No facial asymmetry.  No lateralizing neurological signs.  Mental state examination: Depressed mood and anxious.  Poor eye to eye contact.  No intention to harm herself.  Extremities:  Normal upper extremities  Legs: No edema    Assessment & Plan:   1. Symptom of leg swelling (Primary)  -     US VENOUS DOPPLER LEG BILAT - DIAG IMG (CPT=93970); Future; Expected date: 04/18/2025  Other orders  -     Atorvastatin Calcium; Take 1 tablet (10 mg total) by mouth nightly.  Dispense: 90 tablet; Refill: 3  -     Gabapentin; Take 1 capsule (100 mg total) by mouth 2 (two) times daily as needed.  Dispense: 60 capsule; Refill: 2  -     Donepezil HCl; Take 1 tablet (5 mg total) by mouth nightly.  Dispense: 90 tablet; Refill: 0  2.  Fibromyalgia with problems sleeping.  Patient did not tolerate duloxetine in the past.  I have ordered gabapentin.    3.  Amlodipine will be stopped because of leg swelling and low normal blood pressure.    4.  Minimal cognitive impairment-was evaluated by neurologist in the past and Aricept 5 mg was recommended but patient has not been taking it.  I have sent the prescription.    5.  Generalized anxiety disorder-according to the records, the patient did not tolerate SSRI in the past.  Continue with trazodone.  Alprazolam as needed.  The patient will benefit from behavioral therapists input.  I have made a referral.        No follow-ups on file.    Manjit Bach MD, 4/18/2025, 11:40 AM       [1]   Social History  Tobacco Use   Smoking Status Every Day    Current packs/day: 0.50    Average packs/day: 0.5 packs/day for 40.0 years (20.0 ttl  pk-yrs)    Types: Cigarettes   Smokeless Tobacco Current   [2]   Current Outpatient Medications   Medication Sig Dispense Refill    atorvastatin 10 MG Oral Tab Take 1 tablet (10 mg total) by mouth nightly. 90 tablet 3    gabapentin 100 MG Oral Cap Take 1 capsule (100 mg total) by mouth 2 (two) times daily as needed. 60 capsule 2    Donepezil HCl 5 MG Oral Tablet Dispersible Take 1 tablet (5 mg total) by mouth nightly. 90 tablet 0    traZODone 50 MG Oral Tab Take 1 tablet (50 mg total) by mouth nightly. 30 tablet 0    ALPRAZolam 0.5 MG Oral Tab Take 1 tablet (0.5 mg total) by mouth 6 (six) times daily. 42 tablet 3    omeprazole 20 MG Oral Capsule Delayed Release Take 1 capsule (20 mg total) by mouth every morning before breakfast. 90 capsule 0    nicotine (NICODERM CQ) 14 MG/24HR Transdermal Patch 24 Hr Place 1 patch onto the skin daily. 30 patch 1    TRELEGY ELLIPTA 100-62.5-25 MCG/ACT Inhalation Aerosol Powder, Breath Activated Inhale 1 puff into the lungs daily.      amLODIPine 5 MG Oral Tab Take 1 tablet (5 mg total) by mouth 2 (two) times a day. 180 tablet 1    enalapril 20 MG Oral Tab Take 1 tablet (20 mg total) by mouth 2 (two) times daily. 180 tablet 1    albuterol 108 (90 Base) MCG/ACT Inhalation Aero Soln Inhale 2 puffs into the lungs every 4 to 6 hours as needed for Wheezing. 18 g 3    methylPREDNISolone (MEDROL) 4 MG Oral Tablet Therapy Pack Dosepack: take as directed (Patient not taking: Reported on 4/18/2025) 1 each 0

## 2025-04-18 NOTE — PATIENT INSTRUCTIONS
1.  For your pain, I am starting you on gabapentin 100 mg 2 times a day as required.  2.  Please take cholesterol pill called atorvastatin 10 mg at bedtime.  3.  Last year, the neurologist proposed to start Aricept 5 mg for your memory issues.  I have ordered that medicine for you today.  Remember that, Aricept can have stomach side effects including diarrhea.  4.  I am proposing ultrasound of your legs to make sure that you do not have clots in the leg.  It could possibly be due to the side effect of amlodipine.  Since your blood pressure is in the lower normal levels, I would advise that you stop amlodipine..

## 2025-04-21 NOTE — PROGRESS NOTES
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  We will repeat another ultrasound in 3 years  Danisha Mcgee DO

## 2025-04-30 PROBLEM — F03.B4 MODERATE DEMENTIA WITH ANXIETY (HCC): Status: ACTIVE | Noted: 2025-02-05

## 2025-04-30 PROBLEM — F03.A4 MILD DEMENTIA WITH ANXIETY (HCC): Status: RESOLVED | Noted: 2025-02-05 | Resolved: 2025-04-30

## 2025-05-09 RX ORDER — ESCITALOPRAM OXALATE 5 MG/1
5 TABLET ORAL NIGHTLY
Qty: 90 TABLET | Refills: 0 | OUTPATIENT
Start: 2025-05-09

## 2025-05-14 ENCOUNTER — HOSPITAL ENCOUNTER (OUTPATIENT)
Dept: ULTRASOUND IMAGING | Age: 70
Discharge: HOME | End: 2025-05-14
Attending: INTERNAL MEDICINE
Payer: MEDICARE

## 2025-05-14 DIAGNOSIS — M79.89 SYMPTOM OF LEG SWELLING: ICD-10-CM

## 2025-05-14 PROCEDURE — 93970 EXTREMITY STUDY: CPT | Performed by: INTERNAL MEDICINE

## 2025-05-15 ENCOUNTER — OFFICE VISIT (OUTPATIENT)
Dept: INTERNAL MEDICINE CLINIC | Facility: CLINIC | Age: 70
End: 2025-05-15
Payer: MEDICARE

## 2025-05-15 VITALS
SYSTOLIC BLOOD PRESSURE: 160 MMHG | WEIGHT: 132.81 LBS | BODY MASS INDEX: 25.07 KG/M2 | HEART RATE: 90 BPM | DIASTOLIC BLOOD PRESSURE: 90 MMHG | HEIGHT: 61 IN | OXYGEN SATURATION: 98 % | TEMPERATURE: 98 F

## 2025-05-15 DIAGNOSIS — M79.7 FIBROMYALGIA: Primary | ICD-10-CM

## 2025-05-15 PROCEDURE — 99215 OFFICE O/P EST HI 40 MIN: CPT | Performed by: INTERNAL MEDICINE

## 2025-05-15 RX ORDER — HYDROCHLOROTHIAZIDE 25 MG/1
25 TABLET ORAL DAILY
Qty: 90 TABLET | Refills: 3 | Status: SHIPPED | OUTPATIENT
Start: 2025-05-15

## 2025-05-15 RX ORDER — SERTRALINE HYDROCHLORIDE 25 MG/1
25 TABLET, FILM COATED ORAL DAILY
Qty: 30 TABLET | Refills: 0 | Status: SHIPPED | OUTPATIENT
Start: 2025-05-15

## 2025-05-15 RX ORDER — GABAPENTIN 100 MG/1
100 CAPSULE ORAL NIGHTLY
Qty: 90 CAPSULE | Refills: 0 | Status: SHIPPED | OUTPATIENT
Start: 2025-05-15

## 2025-05-15 NOTE — PROGRESS NOTES
Subjective:   Quyen Wolf is a 69 year old female     The patient has multiple complaints including severe anxiety. Pt has tendency to catastrophize.      The patient has poor sleep and pain in multiple sites of the body.  She claims that tramadol helped her in the past.    History/Other:    No Further Nursing Notes to Review  Tobacco Reviewed  Allergies   Reviewed  Medications Reviewed  Medical History Reviewed  Surgical   History Reviewed  OB Status Reviewed  Family History Reviewed  Social   History Reviewed         Tobacco:  Tobacco Use[1]  E-Cigarettes/Vaping       Questions Responses    E-Cigarette Use Never User           Tobacco cessation counseling for 3-10 minutes (add E/M code #07960).      Current Medications[2]      Review of Systems:  Pertinent items are noted in HPI.  A comprehensive review of systems was negative.      Objective:   /90 (BP Location: Left arm, Patient Position: Sitting, Cuff Size: adult)   Pulse 90   Temp 97.7 °F (36.5 °C) (Temporal)   Ht 5' 1\" (1.549 m)   Wt 132 lb 12.8 oz (60.2 kg)   SpO2 98%   BMI 25.09 kg/m²  Estimated body mass index is 25.09 kg/m² as calculated from the following:    Height as of this encounter: 5' 1\" (1.549 m).    Weight as of this encounter: 132 lb 12.8 oz (60.2 kg).    BP Readings from Last 3 Encounters:   05/15/25 160/90   04/30/25 140/82   04/18/25 100/60     General condition: Anxious looking with increased psychomotor activity  HEENT: Atraumatic normocephalic  No cervical or submandibular lymphadenopathy  Chest: Clear to auscultate  Heart: S1-S2 ,no murmur  Abdomen: Soft non tender, no palpable organomegaly  Pain points are identified in the shoulder neck lower back and thigh area.  Neurological examination: No facial asymmetry.  No lateralizing neurological signs.  Mental state examination: Depressed mood and anxious.  Poor eye to eye contact.  No intention to harm herself.  Extremities:  Normal upper extremities  Legs: Pedal edema  present.    Assessment & Plan:   There are no diagnoses linked to this encounter.      Fibromyalgia with problems sleeping.  I ordered gabapentin but the patient did not take it. Will give 100 mg gabapentin to the patient.  I declined tramadol because of addiction potential and dubious benefit to the patient with chronic pain.    Anxiety/depression: Continue with alprazolam.  Continue with behavioral therapist follow-up. Pt wants to try sertraline this time.     3.  Hypertension with leg swelling.  :    Will start on hydrochlorothiazide 25 mg.      4.  Minimal cognitive impairment-was evaluated by neurologist in the past and Aricept 5 mg was recommended but patient has not been taking it.  I have sent the prescription.            No follow-ups on file.    Manjit Bach MD,          [1]   Social History  Tobacco Use   Smoking Status Every Day    Current packs/day: 0.50    Average packs/day: 0.5 packs/day for 40.0 years (20.0 ttl pk-yrs)    Types: Cigarettes   Smokeless Tobacco Current   [2]   Current Outpatient Medications   Medication Sig Dispense Refill    ALPRAZolam 0.5 MG Oral Tab Take 1 tablet (0.5 mg total) by mouth 6 (six) times daily. 42 tablet 3    traZODone 50 MG Oral Tab Take 1 tablet (50 mg total) by mouth nightly. 30 tablet 2    escitalopram 5 MG Oral Tab Take 1 tablet (5 mg total) by mouth daily. 30 tablet 0    atorvastatin 10 MG Oral Tab Take 1 tablet (10 mg total) by mouth nightly. 90 tablet 3    Donepezil HCl 5 MG Oral Tablet Dispersible Take 1 tablet (5 mg total) by mouth nightly. 90 tablet 0    omeprazole 20 MG Oral Capsule Delayed Release Take 1 capsule (20 mg total) by mouth every morning before breakfast. 90 capsule 0    nicotine (NICODERM CQ) 14 MG/24HR Transdermal Patch 24 Hr Place 1 patch onto the skin daily. 30 patch 1    TRELEGY ELLIPTA 100-62.5-25 MCG/ACT Inhalation Aerosol Powder, Breath Activated Inhale 1 puff into the lungs daily.      amLODIPine 5 MG Oral Tab Take 1 tablet (5 mg  total) by mouth 2 (two) times a day. 180 tablet 1    enalapril 20 MG Oral Tab Take 1 tablet (20 mg total) by mouth 2 (two) times daily. 180 tablet 1    albuterol 108 (90 Base) MCG/ACT Inhalation Aero Soln Inhale 2 puffs into the lungs every 4 to 6 hours as needed for Wheezing. 18 g 3

## 2025-05-29 ENCOUNTER — TELEPHONE (OUTPATIENT)
Dept: NEUROLOGY | Facility: CLINIC | Age: 70
End: 2025-05-29

## 2025-05-29 NOTE — TELEPHONE ENCOUNTER
Due to a family emergency, Dr Mcmullen need to move patient's appointment time to 12:00 today from 4:00. Message left on voice mail and spoke with patient's daughter, Tammy, who states she will call Patient's care giver, Kay,  Requested a call back (Scarlet # given) to confirm move to 12:00. If it is not possible for patient to come in at 12:00, will need to re-schedule.

## 2025-05-29 NOTE — TELEPHONE ENCOUNTER
Today does not work due to transportation. Pt's Dtr will call back to reschedule. Endorsed to RN.

## 2025-06-07 DIAGNOSIS — K21.9 GASTROESOPHAGEAL REFLUX DISEASE, UNSPECIFIED WHETHER ESOPHAGITIS PRESENT: ICD-10-CM

## 2025-06-09 RX ORDER — OMEPRAZOLE 20 MG/1
20 CAPSULE, DELAYED RELEASE ORAL
Qty: 90 CAPSULE | Refills: 0 | Status: SHIPPED | OUTPATIENT
Start: 2025-06-09

## 2025-06-09 NOTE — TELEPHONE ENCOUNTER
Name from pharmacy: OMEPRAZOLE DR 20 MG CAPSULE         Will file in chart as: OMEPRAZOLE 20 MG Oral Capsule Delayed Release    Sig: TAKE 1 CAPSULE BY MOUTH EVERY DAY IN THE MORNING BEFORE BREAKFAST    Disp: 90 capsule    Refills: 0 (Pharmacy requested: Not specified)    Start: 6/7/2025    Class: Normal    Non-formulary For: Gastroesophageal reflux disease, unspecified whether esophagitis present    Last ordered: 2 months ago (3/12/2025) by Danisha Mcgee DO    Last refill: 3/12/2025    Rx #: [id number]    Gastrointestional Medication Protocol [id number]/[id number]/2025 06:57 AM   Protocol Details Medication is active on med list    In person appointment or virtual visit in the past 12 mos or appointment in next 3 mos      To be filled at: [facility]/pharmacy #[id number] - [place], [place] - [address] [place] [address] AT [facility], [phone], [phone]        LOV:  4/30/25  RTC:  1 month  Recent Labs: 3/12/25    Upcoming OV  none

## 2025-07-28 ENCOUNTER — TELEPHONE (OUTPATIENT)
Dept: INTERNAL MEDICINE CLINIC | Facility: CLINIC | Age: 70
End: 2025-07-28

## 2025-07-28 NOTE — TELEPHONE ENCOUNTER
Confirmed appt with daughter Tammy    Future Appointments   Date Time Provider Department Center   8/5/2025  4:30 PM Danisha Mcgee,  [facility] [place]

## 2025-07-28 NOTE — TELEPHONE ENCOUNTER
Patient daughter called and requesting Dr Arely juan pt in for HFU  Please advise   Next HFU opening slot is 8/13

## 2025-08-04 ENCOUNTER — TELEPHONE (OUTPATIENT)
Dept: INTERNAL MEDICINE CLINIC | Facility: CLINIC | Age: 70
End: 2025-08-04

## 2025-08-05 PROBLEM — F13.10 BENZODIAZEPINE ABUSE (HCC): Status: ACTIVE | Noted: 2025-08-05

## 2025-08-05 PROBLEM — S22.000A COMPRESSION FRACTURE OF BODY OF THORACIC VERTEBRA (HCC): Status: ACTIVE | Noted: 2025-08-05

## 2025-08-05 PROBLEM — F51.01 PRIMARY INSOMNIA: Status: ACTIVE | Noted: 2025-08-05

## 2025-08-11 ENCOUNTER — TELEPHONE (OUTPATIENT)
Dept: INTERNAL MEDICINE CLINIC | Facility: CLINIC | Age: 70
End: 2025-08-11

## 2025-08-11 ENCOUNTER — TELEPHONE (OUTPATIENT)
Age: 70
End: 2025-08-11

## 2025-08-13 ENCOUNTER — TELEPHONE (OUTPATIENT)
Dept: INTERNAL MEDICINE CLINIC | Facility: CLINIC | Age: 70
End: 2025-08-13

## 2025-08-13 DIAGNOSIS — G89.29 CHRONIC BILATERAL LOW BACK PAIN WITHOUT SCIATICA: Primary | ICD-10-CM

## 2025-08-13 DIAGNOSIS — G89.29 CHRONIC BILATERAL LOW BACK PAIN, UNSPECIFIED WHETHER SCIATICA PRESENT: ICD-10-CM

## 2025-08-13 DIAGNOSIS — I10 ESSENTIAL HYPERTENSION: ICD-10-CM

## 2025-08-13 DIAGNOSIS — M54.50 CHRONIC BILATERAL LOW BACK PAIN WITHOUT SCIATICA: Primary | ICD-10-CM

## 2025-08-13 DIAGNOSIS — M54.50 CHRONIC BILATERAL LOW BACK PAIN, UNSPECIFIED WHETHER SCIATICA PRESENT: ICD-10-CM

## 2025-08-13 RX ORDER — ENALAPRIL MALEATE 20 MG/1
20 TABLET ORAL 2 TIMES DAILY
Qty: 180 TABLET | Refills: 1 | Status: SHIPPED | OUTPATIENT
Start: 2025-08-13

## 2025-08-15 RX ORDER — GABAPENTIN 100 MG/1
100 CAPSULE ORAL NIGHTLY
Qty: 90 CAPSULE | Refills: 0 | Status: SHIPPED | OUTPATIENT
Start: 2025-08-15 | End: 2025-08-25

## 2025-08-15 RX ORDER — METHYLPREDNISOLONE 4 MG/1
TABLET ORAL
Qty: 21 EACH | Refills: 0 | Status: SHIPPED | OUTPATIENT
Start: 2025-08-15

## 2025-08-22 ENCOUNTER — TELEPHONE (OUTPATIENT)
Dept: ORTHOPEDICS CLINIC | Facility: CLINIC | Age: 70
End: 2025-08-22

## 2025-08-25 ENCOUNTER — OFFICE VISIT (OUTPATIENT)
Dept: ORTHOPEDICS CLINIC | Facility: CLINIC | Age: 70
End: 2025-08-25

## 2025-08-25 ENCOUNTER — HOSPITAL ENCOUNTER (OUTPATIENT)
Dept: GENERAL RADIOLOGY | Age: 70
Discharge: HOME | End: 2025-08-25
Attending: FAMILY MEDICINE

## 2025-08-25 DIAGNOSIS — M54.50 LUMBAR BACK PAIN: Primary | ICD-10-CM

## 2025-08-25 DIAGNOSIS — M54.16 LUMBAR RADICULOPATHY, ACUTE: ICD-10-CM

## 2025-08-25 DIAGNOSIS — M54.50 LUMBAR BACK PAIN: ICD-10-CM

## 2025-08-25 PROCEDURE — 99204 OFFICE O/P NEW MOD 45 MIN: CPT | Performed by: FAMILY MEDICINE

## 2025-08-25 PROCEDURE — 72100 X-RAY EXAM L-S SPINE 2/3 VWS: CPT | Performed by: FAMILY MEDICINE

## 2025-08-25 RX ORDER — GABAPENTIN 300 MG/1
300 CAPSULE ORAL 3 TIMES DAILY
Qty: 30 CAPSULE | Refills: 0 | Status: SHIPPED | OUTPATIENT
Start: 2025-08-25

## 2025-08-25 RX ORDER — PREDNISONE 20 MG/1
40 TABLET ORAL DAILY
Qty: 10 TABLET | Refills: 0 | Status: SHIPPED | OUTPATIENT
Start: 2025-08-25 | End: 2025-08-30

## 2025-08-28 ENCOUNTER — TELEPHONE (OUTPATIENT)
Dept: PAIN CLINIC | Facility: CLINIC | Age: 70
End: 2025-08-28

## 2025-08-28 ENCOUNTER — OFFICE VISIT (OUTPATIENT)
Dept: INTERNAL MEDICINE CLINIC | Facility: CLINIC | Age: 70
End: 2025-08-28

## 2025-08-28 ENCOUNTER — OFFICE VISIT (OUTPATIENT)
Dept: PAIN CLINIC | Facility: CLINIC | Age: 70
End: 2025-08-28

## 2025-08-28 ENCOUNTER — LAB ENCOUNTER (OUTPATIENT)
Dept: LAB | Age: 70
End: 2025-08-28
Attending: INTERNAL MEDICINE

## 2025-08-28 VITALS — DIASTOLIC BLOOD PRESSURE: 72 MMHG | HEART RATE: 86 BPM | SYSTOLIC BLOOD PRESSURE: 120 MMHG | OXYGEN SATURATION: 99 %

## 2025-08-28 VITALS
SYSTOLIC BLOOD PRESSURE: 126 MMHG | OXYGEN SATURATION: 98 % | HEIGHT: 61 IN | RESPIRATION RATE: 18 BRPM | TEMPERATURE: 98 F | DIASTOLIC BLOOD PRESSURE: 80 MMHG | WEIGHT: 122 LBS | HEART RATE: 71 BPM | BODY MASS INDEX: 23.03 KG/M2

## 2025-08-28 DIAGNOSIS — M54.16 LUMBAR RADICULOPATHY: Primary | ICD-10-CM

## 2025-08-28 DIAGNOSIS — E55.9 VITAMIN D DEFICIENCY: ICD-10-CM

## 2025-08-28 DIAGNOSIS — I10 PRIMARY HYPERTENSION: ICD-10-CM

## 2025-08-28 DIAGNOSIS — F41.9 ANXIETY: ICD-10-CM

## 2025-08-28 DIAGNOSIS — Z72.0 TOBACCO USE: ICD-10-CM

## 2025-08-28 DIAGNOSIS — E87.6 HYPOKALEMIA: ICD-10-CM

## 2025-08-28 DIAGNOSIS — R79.89 ELEVATED LFTS: ICD-10-CM

## 2025-08-28 DIAGNOSIS — M54.16 LUMBAR RADICULITIS: Primary | ICD-10-CM

## 2025-08-28 DIAGNOSIS — R63.0 POOR APPETITE: ICD-10-CM

## 2025-08-28 DIAGNOSIS — M54.16 RIGHT LUMBAR RADICULITIS: Primary | ICD-10-CM

## 2025-08-28 PROBLEM — F13.10 BENZODIAZEPINE ABUSE (HCC): Status: RESOLVED | Noted: 2025-08-05 | Resolved: 2025-08-28

## 2025-08-28 PROBLEM — F13.11 BENZODIAZEPINE ABUSE IN REMISSION (HCC): Status: ACTIVE | Noted: 2025-08-05

## 2025-08-28 PROBLEM — F03.B4 MODERATE DEMENTIA WITH ANXIETY (HCC): Status: RESOLVED | Noted: 2025-02-05 | Resolved: 2025-08-28

## 2025-08-28 LAB
ALBUMIN SERPL-MCNC: 4.2 G/DL (ref 3.2–4.8)
ALP LIVER SERPL-CCNC: 124 U/L (ref 55–142)
ALT SERPL-CCNC: 10 U/L (ref 10–49)
AST SERPL-CCNC: 17 U/L (ref ?–34)
BILIRUB DIRECT SERPL-MCNC: 0.1 MG/DL (ref ?–0.3)
BILIRUB SERPL-MCNC: 0.5 MG/DL (ref 0.2–1.1)
MAGNESIUM SERPL-MCNC: 1.7 MG/DL (ref 1.6–2.6)
PROT SERPL-MCNC: 6.6 G/DL (ref 5.7–8.2)
VIT D+METAB SERPL-MCNC: 32.1 NG/ML (ref 30–100)

## 2025-08-28 PROCEDURE — 99214 OFFICE O/P EST MOD 30 MIN: CPT | Performed by: PHYSICIAN ASSISTANT

## 2025-08-28 PROCEDURE — 82306 VITAMIN D 25 HYDROXY: CPT

## 2025-08-28 PROCEDURE — G2211 COMPLEX E/M VISIT ADD ON: HCPCS | Performed by: INTERNAL MEDICINE

## 2025-08-28 PROCEDURE — 80076 HEPATIC FUNCTION PANEL: CPT

## 2025-08-28 PROCEDURE — 83735 ASSAY OF MAGNESIUM: CPT

## 2025-08-28 PROCEDURE — 99214 OFFICE O/P EST MOD 30 MIN: CPT | Performed by: INTERNAL MEDICINE

## 2025-08-28 PROCEDURE — 36415 COLL VENOUS BLD VENIPUNCTURE: CPT

## 2025-08-28 RX ORDER — POTASSIUM CHLORIDE 750 MG/1
10 TABLET, EXTENDED RELEASE ORAL DAILY
Qty: 90 TABLET | Refills: 0 | Status: SHIPPED | OUTPATIENT
Start: 2025-08-28

## 2025-08-28 RX ORDER — MIRTAZAPINE 7.5 MG/1
7.5 TABLET, FILM COATED ORAL NIGHTLY
Qty: 90 TABLET | Refills: 0 | Status: SHIPPED | OUTPATIENT
Start: 2025-08-28

## 2025-08-28 RX ORDER — MELOXICAM 7.5 MG/1
7.5 TABLET ORAL DAILY
Qty: 30 TABLET | Refills: 0 | Status: SHIPPED | OUTPATIENT
Start: 2025-08-28

## (undated) DEVICE — 10FT COMBINED O2 DELIVERY/CO2 MONITORING. FILTER WITH MICROSTREAM TYPE LUER: Brand: DUAL ADULT NASAL CANNULA

## (undated) DEVICE — KIT CUSTOM ENDOPROCEDURE STERIS

## (undated) DEVICE — GIJAW SINGLE-USE BIOPSY FORCEPS WITH NEEDLE: Brand: GIJAW

## (undated) DEVICE — KIT VLV 5 PC AIR H2O SUCT BX ENDOGATOR CONN

## (undated) DEVICE — 1200CC GUARDIAN II: Brand: GUARDIAN

## (undated) DEVICE — BITEBLOCK ENDOSCP 60FR MAXI STRP

## (undated) DEVICE — 3M™ RED DOT™ MONITORING ELECTRODE WITH FOAM TAPE AND STICKY GEL, 50/BAG, 20/CASE, 72/PLT 2570: Brand: RED DOT™

## (undated) NOTE — LETTER
[address]  [address]  Authorization for Surgical Operation and Procedure     Date:___________                                                                                                         Time:__________  I hereby authorize Surgeon(s):  Codey Butt MD, my physician and his/her assistants (if applicable), which may include medical students, residents, and/or fellows, to perform the following surgical operation/ procedure and administer such anesthesia as may be determined necessary by my physician:  Operation/Procedure name (s) Procedure(s):  ESOPHAGOGASTRODUODENOSCOPY (EGD) on Quyen Wolf   2.   I recognize that during the surgical operation/procedure, unforeseen conditions may necessitate additional or different procedures than those listed above.  I, therefore, further authorize and request that the above-named surgeon, assistants, or designees perform such procedures as are, in their judgment, necessary and desirable.    3.   My surgeon/physician has discussed prior to my surgery the potential benefits, risks and side effects of this procedure; the likelihood of achieving goals; and potential problems that might occur during recuperation.  They also discussed reasonable alternatives to the procedure, including risks, benefits, and side effects related to the alternatives and risks related to not receiving this procedure.  I have had all my questions answered and I acknowledge that no guarantee has been made as to the result that may be obtained.    4.   Should the need arise during my operation/procedure, which includes change of level of care prior to discharge, I also consent to the administration of blood and/or blood products.  Further, I understand that despite careful testing and screening of blood or blood products by collecting agencies, I may still be subject to ill effects as a result of receiving a blood transfusion and/or blood products.  The  following are some, but not all, of the potential risks that can occur: fever and allergic reactions, hemolytic reactions, transmission of diseases such as Hepatitis, AIDS and Cytomegalovirus (CMV) and fluid overload.  In the event that I wish to have an autologous transfusion of my own blood, or a directed donor transfusion, I will discuss this with my physician.  Check only if Refusing Blood or Blood Products  I understand refusal of blood or blood products as deemed necessary by my physician may have serious consequences to my condition to include possible death. I hereby assume responsibility for my refusal and release the hospital, its personnel, and my physicians from any responsibility for the consequences of my refusal.          o  Refuse      5.   I authorize the use of any specimen, organs, tissues, body parts or foreign objects that may be removed from my body during the operation/procedure for diagnosis, research or teaching purposes and their subsequent disposal by hospital authorities.  I also authorize the release of specimen test results and/or written reports to my treating physician on the hospital medical staff or other referring or consulting physicians involved in my care, at the discretion of the Pathologist or my treating physician.    6.   I consent to the photographing or videotaping of the operations or procedures to be performed, including appropriate portions of my body for medical, scientific, or educational purposes, provided my identity is not revealed by the pictures or by descriptive texts accompanying them.  If the procedure has been photographed/videotaped, the surgeon will obtain the original picture, image, videotape or CD.  The hospital will not be responsible for storage, release or maintenance of the picture, image, tape or CD.    7.   I consent to the presence of a  or observers in the operating room as deemed necessary by my physician or their designees.     8.   I recognize that in the event my procedure results in extended X-Ray/fluoroscopy time, I may develop a skin reaction.    9. If I have a Do Not Attempt Resuscitation (DNAR) order in place, that status will be suspended while in the operating room, procedural suite, and during the recovery period unless otherwise explicitly stated by me (or a person authorized to consent on my behalf). The surgeon or my attending physician will determine when the applicable recovery period ends for purposes of reinstating the DNAR order.  10. Patients having a sterilization procedure: I understand that if the procedure is successful the results will be permanent and it will therefore be impossible for me to inseminate, conceive, or bear children.  I also understand that the procedure is intended to result in sterility, although the result has not been guaranteed.   11. I acknowledge that my physician has explained sedation/analgesia administration to me including the risk and benefits I consent to the administration of sedation/analgesia as may be necessary or desirable in the judgment of my physician.    I CERTIFY THAT I HAVE READ AND FULLY UNDERSTAND THE ABOVE CONSENT TO OPERATION and/or OTHER PROCEDURE.    _________________________________________  __________________________________  Signature of Patient     Signature of Responsible Person         ___________________________________         Printed Name of Responsible Person           _________________________________                 Relationship to Patient  _________________________________________  ______________________________  Signature of Witness          Date  Time      Patient Name: Quyen Wolf     : 1955                 Printed: May 29, 2024     Medical Record #: [id number]                     Page 1 [address]  [address]    Consent for Anesthesia    I, Quyen Wolf agree to be cared  for by an anesthesiologist, who is specially trained to monitor me and give me medicine to put me to sleep or keep me comfortable during my procedure    I understand that my anesthesiologist is not an employee or agent of [facility] or [employer]. He or she works for [employer].    As the patient asking for anesthesia services, I agree to:  Allow the anesthesiologist (anesthesia doctor) to give me medicine and do additional procedures as necessary. Some examples are: Starting or using an “IV” to give me medicine, fluids or blood during my procedure, and having a breathing tube placed to help me breathe when I’m asleep (intubation). In the event that my heart stops working properly, I understand that my anesthesiologist will make every effort to sustain my life, unless otherwise directed by [facility] Do Not Resuscitate documents.  Tell my anesthesia doctor before my procedure:  If I am pregnant.  The last time that I ate or drank.  All of the medicines I take (including prescriptions, herbal supplements, and pills I can buy without a prescription (including street drugs/illegal medications). Failure to inform my anesthesiologist about these medicines may increase my risk of anesthetic complications.  If I am allergic to anything or have had a reaction to anesthesia before.  I understand how the anesthesia medicine will help me (benefits).  I understand that with any type of anesthesia medicine there are risks:  The most common risks are: nausea, vomiting, sore throat, muscle soreness, damage to my eyes, mouth, or teeth (from breathing tube placement).  Rare risks include: remembering what happened during my procedure, allergic reactions to medications, injury to my airway, heart, lungs, vision, nerves, or muscles and in extremely rare instances death.  My doctor has explained to me other choices available to me for my care (alternatives).  Pregnant Patients (“epidural”):  I  understand that the risks of having an epidural (medicine given into my back to help control pain during labor), include itching, low blood pressure, difficulty urinating, headache or slowing of the baby’s heart. Very rare risks include infection, bleeding, seizure, irregular heart rhythms and nerve injury.  Regional Anesthesia (“spinal”, “epidural”, & “nerve blocks”):  I understand that rare but potential complications include headache, bleeding, infection, seizure, irregular heart rhythms, and nerve injury.    I can change my mind about having anesthesia services at any time before I get the medicine.    _____________________________________________________________________________  Patient (or Representative) Signature/Relationship to Patient  Date   Time    _____________________________________________________________________________   Name (if used)    Language/Organization   Time    _____________________________________________________________________________  Anesthesiologist Signature     Date   Time  I have discussed the procedure and information above with the patient (or patient’s representative) and answered their questions. The patient or their representative has agreed to have anesthesia services.    _____________________________________________________________________________  Witness        Date   Time  I have verified that the signature is that of the patient or patient’s representative, and that it was signed before the procedure  Patient Name: Quyen Wolf     : 1955                 Printed: May 29, 2024     Medical Record #: [id number]                     Page 2 of 2

## (undated) NOTE — ED AVS SNAPSHOT
Js Kendall   MRN: [id number]    Department:  [facility] Emergency Department   Date of Visit:  8/31/2017           Disclosure     Insurance plans vary and the physician(s) referred by the ER may not be covered by your plan.  Please contact your in If you have been prescribed any medication(s), please fill your prescription right away and begin taking the medication(s) as directed    If the emergency physician has read X-rays, these will be re-interpreted by a radiologist.  If there is a significant

## (undated) NOTE — LETTER
[address], [address] [facility], [facility] [address]  [address], [address] [place] [address]   [phone] [address] [facility], [address] [place] [address], [address] [place] [address]   [phone]       August 10, 2020    Dexter Finn  8